# Patient Record
Sex: MALE | Race: WHITE | Employment: FULL TIME | URBAN - METROPOLITAN AREA
[De-identification: names, ages, dates, MRNs, and addresses within clinical notes are randomized per-mention and may not be internally consistent; named-entity substitution may affect disease eponyms.]

---

## 2017-02-20 ENCOUNTER — OFFICE VISIT (OUTPATIENT)
Dept: FAMILY MEDICINE | Facility: CLINIC | Age: 37
End: 2017-02-20

## 2017-02-20 VITALS
WEIGHT: 180.4 LBS | BODY MASS INDEX: 28.31 KG/M2 | DIASTOLIC BLOOD PRESSURE: 70 MMHG | HEART RATE: 96 BPM | HEIGHT: 67 IN | TEMPERATURE: 98.6 F | SYSTOLIC BLOOD PRESSURE: 128 MMHG | RESPIRATION RATE: 20 BRPM

## 2017-02-20 DIAGNOSIS — Z00.00 ENCOUNTER FOR ROUTINE ADULT HEALTH EXAMINATION WITHOUT ABNORMAL FINDINGS: Primary | ICD-10-CM

## 2017-02-20 DIAGNOSIS — L70.0 ACNE VULGARIS: ICD-10-CM

## 2017-02-20 PROCEDURE — 99395 PREV VISIT EST AGE 18-39: CPT | Performed by: PHYSICIAN ASSISTANT

## 2017-02-20 NOTE — MR AVS SNAPSHOT
After Visit Summary   2/20/2017    Ella Elizabeth    MRN: [id number]           Patient Information     Date Of Birth          1980        Visit Information        Provider Department      2/20/2017 1:00 PM Zoe Monet PA-C [facility]        Today's Diagnoses     Encounter for routine adult health examination without abnormal findings    -  1    Acne vulgaris           Follow-ups after your visit        Additional Services     DERMATOLOGY REFERRAL       Your provider has referred you to: [facility] [phone]   [email or web address]    Please be aware that coverage of these services is subject to the terms and limitations of your health insurance plan.  Call member services at your health plan with any benefit or coverage questions.      Please bring the following with you to your appointment:    (1) Any X-Rays, CTs or MRIs which have been performed.  Contact the facility where they were done to arrange for  prior to your scheduled appointment.    (2) List of current medications  (3) This referral request   (4) Any documents/labs given to you for this referral                  Follow-up notes from your care team     Return in about 1 year (around 2/20/2018) for Physical Exam.      Who to contact     If you have questions or need follow up information about today's clinic visit or your schedule please contact [facility] directly at [phone].  Normal or non-critical lab and imaging results will be communicated to you by MyChart, letter or phone within 4 business days after the clinic has received the results. If you do not hear from us within 7 days, please contact the clinic through MyChart or phone. If you have a critical or abnormal lab result, we will notify you by phone as soon as possible.  Submit refill requests through [employer] or call your pharmacy and they will forward the  "refill request to us. Please allow 3 business days for your refill to be completed.          Additional Information About Your Visit        MyChart Information     [employer] lets you send messages to your doctor, view your test results, renew your prescriptions, schedule appointments and more. To sign up, go to [email or web address] . Click on \"Log in\" on the left side of the screen, which will take you to the Welcome page. Then click on \"Sign up Now\" on the right side of the page.     You will be asked to enter the access code listed below, as well as some personal information. Please follow the directions to create your username and password.     Your access code is: [email or web address]  Expires: 2017 11:03 PM     Your access code will  in 90 days. If you need help or a new code, please call your [place] clinic or [phone].        Care EveryWhere ID     This is your [facility] EveryWhere ID. This could be used by other organizations to access your [place] medical records  [id number]        Your Vitals Were     Pulse Temperature Respirations Height BMI (Body Mass Index)       96 98.6  F (37  C) (Oral) 20 1.702 m (5' 7\") 28.25 kg/m2        Blood Pressure from Last 3 Encounters:   17 128/70   16 120/80    Weight from Last 3 Encounters:   17 81.8 kg (180 lb 6.4 oz)   16 83 kg (183 lb)              We Performed the Following     DERMATOLOGY REFERRAL        Primary Care Provider Office Phone # Fax #    Zoe Monet PA-C [phone]       [facility] [address] [facility] [address]  [facility] [address]        Thank you!     Thank you for choosing [facility]  for your care. Our goal is always to provide you with excellent care. Hearing back from our patients is one way we can continue to improve our services. Please take a few minutes to complete the written survey that you may receive in the mail after your visit with us. Thank " you!             Your Updated Medication List - Protect others around you: Learn how to safely use, store and throw away your medicines at www.disposemymeds.org.      Notice  As of 2/20/2017 11:03 PM    You have not been prescribed any medications.

## 2017-02-20 NOTE — NURSING NOTE
Ella Elizabeth is here for a CPX.    Pre-visit planning  Immunizations -up to date  Colonoscopy -  Mammogram -  Asthma test --  PHQ9 -  RC 7 -    Questioned patient about current smoking habits.  Pt. has never smoked.  Body mass index is 28.25 kg/(m^2).  PULSE regular  My Chart:   CLASSIFICATION OF OVERWEIGHT AND OBESITY BY BMI                        Obesity Class           BMI(kg/m2)  Underweight                                    < 18.5  Normal                                         18.5-24.9  Overweight                                     25.0-29.9  OBESITY                     I                  30.0-34.9                             II                 35.0-39.9  EXTREME OBESITY             III                >40                            Patient's  BMI Body mass index is 28.25 kg/(m^2).  Http://hin.nhlbi.nih.gov/menuplanner/menu.cgi  ETOH screening:  Questions:  1-How often do you have a drink containing alcohol?                             2-3 times per week(s)  2-How many drinks containing alcohol do you have on a typical day when you are         Drinking?                              1 - 2   3- How often do you have 5 or more drinks on one occasion?                              never per     Have you ever:  @None of the patient's responses to the CAGE screening were positive / Negative CAGE score@

## 2017-02-20 NOTE — PROGRESS NOTES
Ella Elizabeth is a 36 year old male presents for routine health maintenance.    Current concerns: Ella came in last year with facial redness and some small red bumps. Dr. Johns started him on minocycline which helped, but noticed it got worse when his prescription stopped, where the redness immediately worsening and the bumps came back even worse than before. Would like to see a dermatologist to go through options and would consider Accutane. Has been taking Vitamin A supplement as he has heard that Accutane is similar to Vitamin A.    Body mass index is 28.25 kg/(m^2).    Present exercise habits:  1-2 times/week  Present dietary habits:  eats regular meals and follows a balanced nutrition diet    Vit D intake: is not taking supplement    Is the patient a smoker? No  If yes, smoking cessation advised and counseling provided.     Cardiovascular risk factors: none    Over the past few weeks, have you felt down or depressed? Little interest or pleasure in doing things? No    Last dental appointment:  this year  Last optical appointment:  this year    Was the patient born between [birth year] and has not had Hep C testing?  No, not applicable    I have reviewed the following histories: Past Medical History, Past Surgical History, Social History, Family History, Problem List, Medication List and Allergies    Past Medical History   Diagnosis Date     NO ACTIVE PROBLEMS      Family History   Problem Relation Age of Onset     DIABETES No family hx of      Coronary Artery Disease No family hx of      Hypertension No family hx of      Hyperlipidemia No family hx of      CEREBROVASCULAR DISEASE No family hx of      Breast Cancer No family hx of      Colon Cancer No family hx of      Prostate Cancer No family hx of      Other Cancer No family hx of      Depression No family hx of      Anxiety Disorder No family hx of      MENTAL ILLNESS No family hx of      Substance Abuse No family hx of      Anesthesia Reaction No family hx  "of      Asthma No family hx of      OSTEOPOROSIS No family hx of      Thyroid Disease No family hx of      Genetic Disorder No family hx of      Obesity No family hx of      Social History     Social History     Marital status:      Spouse name: Luna Coles     Number of children: 2     Years of education: [employer] grad     Occupational History           Social History Main Topics     Smoking status: Never Smoker     Smokeless tobacco: Never Used     Alcohol use 1.2 - 1.8 oz/week     2 - 3 Standard drinks or equivalent per week     Drug use: No     Sexual activity: Yes     Partners: Female     Other Topics Concern      Service No     Blood Transfusions No     Caffeine Concern No     Occupational Exposure No     Hobby Hazards No     Sleep Concern No     Stress Concern No     Weight Concern No     Special Diet No     Back Care No     Seat Belt Yes     Social History Narrative     Patient Active Problem List   Diagnosis     Health Care Home     ACP (advance care planning)     No current outpatient prescriptions on file.     No current facility-administered medications for this visit.        Allergies:  No Known Allergies      ROS:  E/M: NEGATIVE for ear, nose, mouth and throat problems  R: NEGATIVE for significant/chronic cough or SOB  CV: NEGATIVE for chest pain or palpitations  GI: NEGATIVE for abdominal pain, chronic diarrhea or constipation  : NEGATIVE for dysuria, hematuria, weakened urinary stream      OBJECTIVE:    Vitals:    02/20/17 1257   BP: 128/70   BP Location: Right arm   Patient Position: Chair   Cuff Size: Adult Regular   Pulse: 96   Resp: 20   Temp: 98.6  F (37  C)   TempSrc: Oral   Weight: 81.8 kg (180 lb 6.4 oz)   Height: 1.702 m (5' 7\")       General: 36 year old male who appears her stated age. Vital signs noted.  Head: Normocephalic  Eyes: pupils equal round reactive to light and accomodation, extra ocular movements intact  Ears: external canals and tms free of " "abnormalities  Nose: patent, without mucosal abnormalities  Mouth and throat: without erythema or lesions of the mucosa  Neck: supple, without adenopathy or thyromegaly  Lungs: clear to auscultation, no wheezing or crackles  Cv: regular rate and rhythm, normal s1 and s2 without murmur or click  Abd: soft, non-tender, no masses, no hepatomegaly or splenomegaly.  Gu: normal external genitalia, no hernia  Ms: normal muscle tone & symmetry  Skin: Diffuse erythema on cheeks, forehead. Erythematous papules also scattered on peripheral surfaces of face. Occasional pustule.   Neuro: sensation and motor function grossly intact; cranial nerves without obvious abnormalities.        ASSESSMENT/PLAN:    1. Encounter for routine adult health examination without abnormal findings  No concerns today, not fasting, will recheck labs from last year in 1-2 years.    2. Acne vulgaris  Given that acne vulgaris did not resolve with 1 year of minocycline recommended being seen by dermatologist. They may need to consider other diagnoses like rosacea.  - DERMATOLOGY REFERRAL     reports that he has never smoked. He has never used smokeless tobacco.      Estimated body mass index is 28.25 kg/(m^2) as calculated from the following:    Height as of this encounter: 1.702 m (5' 7\").    Weight as of this encounter: 81.8 kg (180 lb 6.4 oz).  Weight management plan: Discussed healthy diet and exercise guidelines and patient will follow up in 12 months in clinic to re-evaluate.      Meds Suggested:      Vitamin D  Tests Recommended:      Regular Dental Examinations        Eye exam  Behavior Modifications:       Cardiovascular exercise 3 times per week--enough to get your Target Heart rate  Other recommendations:     BMI noted and discussed      Regular testicle exam     Encouraged My Chart    The patient will return to the clinic if symptoms are changing or concern with follow up as discussed. The patient understands and agrees with the " plan.      Zoe Monet PA-C  2/20/2017          Counseling Resources:  ATP IV Guidelines  Pooled Cohorts Equation Calculator  Breast Cancer Risk Calculator  FRAX Risk Assessment  ICSI Preventive Guidelines  Dietary Guidelines for Americans, 2010  [employer]'s MyPlate

## 2018-01-10 ENCOUNTER — OFFICE VISIT (OUTPATIENT)
Dept: FAMILY MEDICINE | Facility: CLINIC | Age: 38
End: 2018-01-10

## 2018-01-10 VITALS
BODY MASS INDEX: 26.27 KG/M2 | DIASTOLIC BLOOD PRESSURE: 68 MMHG | WEIGHT: 167.4 LBS | HEART RATE: 88 BPM | RESPIRATION RATE: 20 BRPM | TEMPERATURE: 98.6 F | SYSTOLIC BLOOD PRESSURE: 104 MMHG | HEIGHT: 67 IN

## 2018-01-10 DIAGNOSIS — J10.1 INFLUENZA B: Primary | ICD-10-CM

## 2018-01-10 LAB
FLUAV AG UPPER RESP QL IA.RAPID: ABNORMAL
FLUBV AG UPPER RESP QL IA.RAPID: ABNORMAL

## 2018-01-10 PROCEDURE — 99213 OFFICE O/P EST LOW 20 MIN: CPT | Performed by: PHYSICIAN ASSISTANT

## 2018-01-10 PROCEDURE — 87804 INFLUENZA ASSAY W/OPTIC: CPT | Performed by: PHYSICIAN ASSISTANT

## 2018-01-10 NOTE — PROGRESS NOTES
"CC: Flu-like symptoms    History:  Just over 48 hours ago, Ella started feeling significant sore throat, congestion, body aches, chills/sweats, headache, cough. Does not think he has had flu before. Did fly 1-2 days before onset. Feels like he is doing okay at drinking fluids, but could push more fluids. No known flu exposure.     Has been taking ibuprofen, Cepachol, Tylenol.     PMH, MEDICATIONS, ALLERGIES, SOCIAL AND FAMILY HISTORY in [facility] and reviewed by me personally.      ROS negative other than the symptoms noted above in the HPI.        Examination   /68 (BP Location: Left arm, Patient Position: Chair, Cuff Size: Adult Regular)  Pulse 88  Temp 98.6  F (37  C) (Oral)  Resp 20  Ht 1.702 m (5' 7\")  Wt 75.9 kg (167 lb 6.4 oz)  BMI 26.22 kg/m2       Constitutional: Sitting comfortably, in no acute distress. Vital signs noted  Eyes: pupils equal round reactive to light and accomodation, extra ocular movements intact  Ears: external canals and TMs free of abnormalities  Nose: patent, without mucosal abnormalities  Mouth and throat: without erythema or lesions of the mucosa  Neck:  no adenopathy, trachea midline and normal to palpation  Cardiovascular:  regular rate and rhythm, no murmurs, clicks, or gallops  Respiratory:  normal respiratory rate and rhythm, lungs clear to auscultation  SKIN: No jaundice/pallor/rash.   Psychiatric: mentation appears normal and affect normal/bright        A/P    ICD-10-CM    1. Influenza B J10.1 Influenza A and B (BFP)       DISCUSSION: Influenza swab positive for influenza B.   Explained that infleunza tends to resolve within 5-7 days.   Offered Tamiflu to take if interested. No high-quality evidence to say it's effective, and if effective thought to only shorten symptoms by approximately 1 day.   Patient declined   Patient declined work excuse note to return to work when symptoms resolve.   Further recommended symptomatic therapies:  Alternate ibuprofen (with food) and " Tylenol every 3 hours to help with pain and inflammation.  Can substitute cough suppressant like Dayquil/Delsym/Robitussin for Tylenol dose, as these usually contain Tylenol/acetaminophen.  Should continue to push fluids, and should try to find simple foods to help gain calories. Go to ER if unable to get fluid intake, extreme fatigue, or cannot keep fever <102 degrees.     Contact me with any questions or concerns.    follow up visit: As needed    Zoe Monet PA-C  [facility]

## 2018-01-10 NOTE — MR AVS SNAPSHOT
"              After Visit Summary   1/10/2018    Ella Elizabeth    MRN: [id number]           Patient Information     Date Of Birth          1980        Visit Information        Provider Department      1/10/2018 11:30 AM Zoe Monet PA-C [facility]        Today's Diagnoses     Fever    -  1      Care Instructions    Alternate Tylenol and ibuprofen every 3 hours.  Can substitute Dayquil for Tylenol.  Push fluids, and try to get some calorie intake.  ER for IV fluids if worsening.  Should improve significantly after 3 days, and return to normal in approximately 7 days.             Follow-ups after your visit        Who to contact     If you have questions or need follow up information about today's clinic visit or your schedule please contact [facility] directly at [phone].  Normal or non-critical lab and imaging results will be communicated to you by [employer], letter or phone within 4 business days after the clinic has received the results. If you do not hear from us within 7 days, please contact the clinic through [employer] or phone. If you have a critical or abnormal lab result, we will notify you by phone as soon as possible.  Submit refill requests through [employer] or call your pharmacy and they will forward the refill request to us. Please allow 3 business days for your refill to be completed.          Additional Information About Your Visit        [employer] Information     [employer] lets you send messages to your doctor, view your test results, renew your prescriptions, schedule appointments and more. To sign up, go to [email or web address] . Click on \"Log in\" on the left side of the screen, which will take you to the Welcome page. Then click on \"Sign up Now\" on the right side of the page.     You will be asked to enter the access code listed below, as well as some personal information. Please follow the directions to create your username and " "password.     Your access code is: [email or web address]  Expires: 4/10/2018 12:15 PM     Your access code will  in 90 days. If you need help or a new code, please call your [place] clinic or [phone].        Care EveryWhere ID     This is your Care EveryWhere ID. This could be used by other organizations to access your [place] medical records  [id number]        Your Vitals Were     Pulse Temperature Respirations Height BMI (Body Mass Index)       88 98.6  F (37  C) (Oral) 20 1.702 m (5' 7\") 26.22 kg/m2        Blood Pressure from Last 3 Encounters:   01/10/18 104/68   17 128/70   16 120/80    Weight from Last 3 Encounters:   01/10/18 75.9 kg (167 lb 6.4 oz)   17 81.8 kg (180 lb 6.4 oz)   16 83 kg (183 lb)              We Performed the Following     Influenza A and B (BFP)        Primary Care Provider Office Phone # Fax #    Zoe Monet PA-C [phone]       [address] [place] [id number]        Equal Access to Services     MARILU NOVAK : Hadii aad ku hadasho Soomaali, waaxda luqadaha, qaybta kaalmada adeegyada, waxay idiin haymarisoln monico aiken lashabbir . So [facility] [phone].    ATENCIÓN: Si habla español, tiene a boles disposición servicios gratuitos de asistencia lingüística. Lllake al [phone].    We comply with applicable federal civil rights laws and [place] laws. We do not discriminate on the basis of race, color, national origin, age, disability, sex, sexual orientation, or gender identity.            Thank you!     Thank you for choosing [facility]  for your care. Our goal is always to provide you with excellent care. Hearing back from our patients is one way we can continue to improve our services. Please take a few minutes to complete the written survey that you may receive in the mail after your visit with us. Thank you!             Your Updated Medication List - Protect others around you: Learn how to safely " use, store and throw away your medicines at www.disposemymeds.org.      Notice  As of 1/10/2018 12:15 PM    You have not been prescribed any medications.

## 2018-01-10 NOTE — NURSING NOTE
Ella Elizabeth presents with an illness characterized by dry cough, fever, chills, sweats, nasal congestion, rhinorrhea and sore throat. Symptoms began 3 days ago and are unchanged since that time.  Questioned patient about current smoking habits.  Pt. has never smoked.  Body mass index is 33.67 kg/(m^2).  PULSE regular  My Chart:   Body mass index is 26.22 kg/(m^2).  Pre-visit planning  Immunizations - up to date  Colonoscopy -   Mammogram -   Asthma -   PHQ9 -    RC-7 -

## 2018-01-10 NOTE — PATIENT INSTRUCTIONS
Alternate Tylenol and ibuprofen every 3 hours.  Can substitute Dayquil for Tylenol.  Push fluids, and try to get some calorie intake.  ER for IV fluids if worsening.  Should improve significantly after 3 days, and return to normal in approximately 7 days.

## 2018-01-30 ENCOUNTER — OFFICE VISIT (OUTPATIENT)
Dept: FAMILY MEDICINE | Facility: CLINIC | Age: 38
End: 2018-01-30

## 2018-01-30 VITALS
TEMPERATURE: 100.2 F | DIASTOLIC BLOOD PRESSURE: 70 MMHG | BODY MASS INDEX: 26.65 KG/M2 | HEIGHT: 67 IN | RESPIRATION RATE: 20 BRPM | HEART RATE: 84 BPM | WEIGHT: 169.8 LBS | OXYGEN SATURATION: 96 % | SYSTOLIC BLOOD PRESSURE: 124 MMHG

## 2018-01-30 DIAGNOSIS — R05.9 COUGH: ICD-10-CM

## 2018-01-30 DIAGNOSIS — J10.1 INFLUENZA B: Primary | ICD-10-CM

## 2018-01-30 LAB
% GRANULOCYTES: 56.7 %
HCT VFR BLD AUTO: 45.4 % (ref 40–53)
HEMOGLOBIN: 14.6 G/DL (ref 13.3–17.7)
LYMPHOCYTES NFR BLD AUTO: 28 %
MCH RBC QN AUTO: 30.2 PG (ref 26–33)
MCHC RBC AUTO-ENTMCNC: 32.2 G/DL (ref 31–36)
MCV RBC AUTO: 93.6 FL (ref 78–100)
MONOCYTES NFR BLD AUTO: 15.3 %
PLATELET COUNT - QUEST: 191 10^9/L (ref 150–375)
RBC # BLD AUTO: 4.83 10*12/L (ref 4.4–5.9)
WBC # BLD AUTO: 4 10*9/L (ref 4–11)

## 2018-01-30 PROCEDURE — 36415 COLL VENOUS BLD VENIPUNCTURE: CPT | Performed by: PHYSICIAN ASSISTANT

## 2018-01-30 PROCEDURE — 71046 X-RAY EXAM CHEST 2 VIEWS: CPT | Performed by: PHYSICIAN ASSISTANT

## 2018-01-30 PROCEDURE — 99213 OFFICE O/P EST LOW 20 MIN: CPT | Performed by: PHYSICIAN ASSISTANT

## 2018-01-30 PROCEDURE — 85025 COMPLETE CBC W/AUTO DIFF WBC: CPT | Performed by: PHYSICIAN ASSISTANT

## 2018-01-30 RX ORDER — AZITHROMYCIN 250 MG/1
TABLET, FILM COATED ORAL
Qty: 6 TABLET | Refills: 0 | Status: SHIPPED | OUTPATIENT
Start: 2018-01-30 | End: 2018-09-12

## 2018-01-30 NOTE — MR AVS SNAPSHOT
"              After Visit Summary   1/30/2018    Ella Elizabeth    MRN: [id number]           Patient Information     Date Of Birth          1980        Visit Information        Provider Department      1/30/2018 11:00 AM Delfina Escudero PA [facility]        Today's Diagnoses     Influenza B    -  1    Cough           Follow-ups after your visit        Who to contact     If you have questions or need follow up information about today's clinic visit or your schedule please contact [facility] directly at [phone].  Normal or non-critical lab and imaging results will be communicated to you by MyChart, letter or phone within 4 business days after the clinic has received the results. If you do not hear from us within 7 days, please contact the clinic through MyChart or phone. If you have a critical or abnormal lab result, we will notify you by phone as soon as possible.  Submit refill requests through [employer] or call your pharmacy and they will forward the refill request to us. Please allow 3 business days for your refill to be completed.          Additional Information About Your Visit        Care EveryWhere ID     This is your Care EveryWhere ID. This could be used by other organizations to access your [place] medical records  [id number]        Your Vitals Were     Pulse Temperature Respirations Height Pulse Oximetry BMI (Body Mass Index)    84 100.2  F (37.9  C) (Oral) 20 1.702 m (5' 7\") 96% 26.59 kg/m2       Blood Pressure from Last 3 Encounters:   01/30/18 124/70   01/10/18 104/68   02/20/17 128/70    Weight from Last 3 Encounters:   01/30/18 77 kg (169 lb 12.8 oz)   01/10/18 75.9 kg (167 lb 6.4 oz)   02/20/17 81.8 kg (180 lb 6.4 oz)              We Performed the Following     CL AFF HEMOGRAM/PLATE/DIFF (BFP)     HC XRAY CHEST, 2 VIEWS     VENOUS COLLECTION          Today's Medication Changes          These changes are accurate as of 1/30/18 " 11:59 PM.  If you have any questions, ask your nurse or doctor.               Start taking these medicines.        Dose/Directions    azithromycin 250 MG tablet   Commonly known as:  ZITHROMAX   Used for:  Cough   Started by:  Delfina Escudero PA        Two tablets first day, then one tablet daily for four days.   Quantity:  6 tablet   Refills:  0            Where to get your medicines      These medications were sent to [place] [address] [place] [facility] - [place], [place] - [address] [place] [address] [address] [facility] [address], [facility] [address]     Phone:  [phone]     azithromycin 250 MG tablet                Primary Care Provider Office Phone # Fax #    Zoe Denisse Mnoet PA-C [phone] [address] [address]        Equal Access to Services     [facility] SCARLET : Hadii olman hickman hadasho Soomaali, waaxda luqadaha, qaybta kaalmada adeegyada, nino cintron . So [facility] [phone].    ATENCIÓN: Si habla español, tiene a boles disposición servicios gratuitos de asistencia lingüística. Bonny al [phone].    We comply with applicable federal civil rights laws and [place] laws. We do not discriminate on the basis of race, color, national origin, age, disability, sex, sexual orientation, or gender identity.            Thank you!     Thank you for choosing [facility]  for your care. Our goal is always to provide you with excellent care. Hearing back from our patients is one way we can continue to improve our services. Please take a few minutes to complete the written survey that you may receive in the mail after your visit with us. Thank you!             Your Updated Medication List - Protect others around you: Learn how to safely use, store and throw away your medicines at www.disposemymeds.org.          This list is accurate as of 1/30/18 11:59 PM.  Always use your most recent med list.                   Brand Name Dispense  Instructions for use Diagnosis    azithromycin 250 MG tablet    ZITHROMAX    6 tablet    Two tablets first day, then one tablet daily for four days.    Cough

## 2018-01-30 NOTE — NURSING NOTE
Ella Elizabeth is here for a cough and not feeling well. He has already had the flu, but feels like it is coming back.    Questioned patient about current smoking habits.  Pt. has never smoked.  PULSE regular  My Chart: declines  CLASSIFICATION OF OVERWEIGHT AND OBESITY BY BMI                        Obesity Class           BMI(kg/m2)  Underweight                                    < 18.5  Normal                                         18.5-24.9  Overweight                                     25.0-29.9  OBESITY                     I                  30.0-34.9                             II                 35.0-39.9  EXTREME OBESITY             III                >40                            Patient's  BMI Body mass index is 26.59 kg/(m^2).  http://hin.nhlbi.nih.gov/menuplanner/menu.cgi  Pre-visit planning  Immunizations - up to date  Colonoscopy -   Mammogram -   Asthma -   PHQ9 -    RC-7 -
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
## 2018-09-12 ENCOUNTER — OFFICE VISIT (OUTPATIENT)
Dept: FAMILY MEDICINE | Facility: CLINIC | Age: 38
End: 2018-09-12

## 2018-09-12 VITALS
DIASTOLIC BLOOD PRESSURE: 68 MMHG | HEART RATE: 91 BPM | TEMPERATURE: 99 F | BODY MASS INDEX: 25.94 KG/M2 | SYSTOLIC BLOOD PRESSURE: 118 MMHG | WEIGHT: 165.6 LBS | OXYGEN SATURATION: 97 %

## 2018-09-12 DIAGNOSIS — S69.92XA HAND INJURY, LEFT, INITIAL ENCOUNTER: Primary | ICD-10-CM

## 2018-09-12 DIAGNOSIS — Z23 NEED FOR VACCINATION: ICD-10-CM

## 2018-09-12 DIAGNOSIS — Z30.2 ENCOUNTER FOR VASECTOMY: ICD-10-CM

## 2018-09-12 PROCEDURE — 90686 IIV4 VACC NO PRSV 0.5 ML IM: CPT | Performed by: PHYSICIAN ASSISTANT

## 2018-09-12 PROCEDURE — 99213 OFFICE O/P EST LOW 20 MIN: CPT | Mod: 25 | Performed by: PHYSICIAN ASSISTANT

## 2018-09-12 PROCEDURE — 90471 IMMUNIZATION ADMIN: CPT | Performed by: PHYSICIAN ASSISTANT

## 2018-09-12 PROCEDURE — 73130 X-RAY EXAM OF HAND: CPT | Mod: LT | Performed by: PHYSICIAN ASSISTANT

## 2018-09-12 NOTE — PROGRESS NOTES
CC: Hockey injury left hand    History:  Ella is here with a left hand injury that happened 3 weeks ago playing hockey. Was taking slap shots, and suddenly felt pain in medial surface of left hand new middle of 5th MCP. It had been a tolerable pain, and had been able to continue playing hockey, but today worsened again with playing hockey. Anything flexing the 5th finger forward seems to hurt. No numbness/tingling out into fingertips. Has taken a few ibuprofen, but hasn't really rested or iced.      Ella would also like a referral for a vasectomy.     PMH, MEDICATIONS, ALLERGIES, SOCIAL AND FAMILY HISTORY in [facility] and reviewed by me personally.      ROS negative other than the symptoms noted above in the HPI.        Examination   /68 (BP Location: Right arm, Patient Position: Sitting, Cuff Size: Adult Regular)  Pulse 91  Temp 99  F (37.2  C) (Oral)  Wt 75.1 kg (165 lb 9.6 oz)  SpO2 97%  BMI 25.94 kg/m2       Constitutional: Sitting comfortably, in no acute distress. Vital signs noted  Neck:  no adenopathy, trachea midline and normal to palpation  Cardiovascular:  regular rate and rhythm, no murmurs, clicks, or gallops  Respiratory:  normal respiratory rate and rhythm, lungs clear to auscultation  M/S: ROM of left hand 5th digit 80% of normal flexion, full extension. Mild pain over mid 5th MCP recreated with flexion, but no tenderness to palpation.  NEURO:  muscle strength normal, reflexes normal and symmetric  SKIN: No jaundice/pallor/rash.   Psychiatric: mentation appears normal and affect normal/bright        A/P    ICD-10-CM    1. Hand injury, left, initial encounter S69.92XA XR Hand Left G/E 3 Views   2. Encounter for vasectomy Z30.2 UROLOGY ADULT REFERRAL   3. Need for vaccination Z23 HC FLU VAC PRESRV FREE QUAD SPLIT VIR 3+YRS IM     VACCINE ADMINISTRATION, INITIAL       DISCUSSION:  X-ray of left hand appears negative- confirmed by radiology report. Most likely a ligament injury that has not  been given the proper treatment to recover. Recommended ice 2-3 times daily for 15-20 minutes. Take Aleve 1 tablet twice daily with food. Avoid aggravating activities like hockey for 2 weeks. Contact me at that time if not resolving, and would refer to hand specialists/ortho to consider MRI, PT, other treatment options.     Referred to urology for vasectomy.    follow up visit: As needed    Zoe Monet PA-C  [facility]

## 2018-09-12 NOTE — NURSING NOTE
Ella is here for L hand injured playing hockey 3 weeks ago, got worse during today's game        Pre-visit Screening:  Immunizations:  up to date  Colonoscopy:  NA  Mammogram: NA  Asthma Action Test/Plan:  NA  PHQ9:  none  GAD7:  none  Questioned patient about current smoking habits Pt. has never smoked.  Ok to leave detailed message on voice mail for today's visit only Yes, phone # [phone]

## 2018-09-12 NOTE — MR AVS SNAPSHOT
After Visit Summary   9/12/2018    Ella Elizabeth    MRN: [id number]           Patient Information     Date Of Birth          1980        Visit Information        Provider Department      9/12/2018 2:30 PM Zoe Monet PA-C [facility]        Today's Diagnoses     Hand injury, left, initial encounter    -  1    Encounter for vasectomy        Need for vaccination           Follow-ups after your visit        Additional Services     UROLOGY ADULT REFERRAL       Your provider has referred you to: OTHER PROVIDERS:  [facility] [place]. Please fax referral. [phone] (M) [facility]    Please be aware that coverage of these services is subject to the terms and limitations of your health insurance plan.  Call member services at your health plan with any benefit or coverage questions.      Please bring the following with you to your appointment:    (1) Any X-Rays, CTs or MRIs which have been performed.  Contact the facility where they were done to arrange for  prior to your scheduled appointment.    (2) List of current medications  (3) This referral request   (4) Any documents/labs given to you for this referral                  Follow-up notes from your care team     Return if symptoms worsen or fail to improve.      Who to contact     If you have questions or need follow up information about today's clinic visit or your schedule please contact [facility] directly at [phone].  Normal or non-critical lab and imaging results will be communicated to you by MyChart, letter or phone within 4 business days after the clinic has received the results. If you do not hear from us within 7 days, please contact the clinic through MyChart or phone. If you have a critical or abnormal lab result, we will notify you by phone as soon as possible.  Submit refill requests through [employer] or call your pharmacy and they will forward the refill request  "to us. Please allow 3 business days for your refill to be completed.          Additional Information About Your Visit        [employer]hart Information     [employer] lets you send messages to your doctor, view your test results, renew your prescriptions, schedule appointments and more. To sign up, go to [email or web address] . Click on \"Log in\" on the left side of the screen, which will take you to the Welcome page. Then click on \"Sign up Now\" on the right side of the page.     You will be asked to enter the access code listed below, as well as some personal information. Please follow the directions to create your username and password.     Your access code is: [email or web address]  Expires: 2018 11:54 PM     Your access code will  in 90 days. If you need help or a new code, please call your [place] clinic or [phone].        Care EveryWhere ID     This is your Care EveryWhere ID. This could be used by other organizations to access your [place] medical records  [id number]        Your Vitals Were     Pulse Temperature Pulse Oximetry BMI (Body Mass Index)          91 99  F (37.2  C) (Oral) 97% 25.94 kg/m2         Blood Pressure from Last 3 Encounters:   18 118/68   18 124/70   01/10/18 104/68    Weight from Last 3 Encounters:   18 75.1 kg (165 lb 9.6 oz)   18 77 kg (169 lb 12.8 oz)   01/10/18 75.9 kg (167 lb 6.4 oz)              We Performed the Following     HC FLU VAC PRESRV FREE QUAD SPLIT VIR 3+YRS IM     UROLOGY ADULT REFERRAL     VACCINE ADMINISTRATION, INITIAL     XR Hand Left G/E 3 Views        Primary Care Provider Office Phone # Fax #    Zoe Monet PA-C [phone] [address] [address]        Equal Access to Services     NUNU NOVAK : [place], waaxjohny luqrenay, qatoña whitfieldalnino jay. [facility] [phone].    ATENCIÓN: Si alyssa robertson " disposición servicios gratuitos de asistencia lingüística. Bonny willard [phone].    We comply with applicable federal civil rights laws and [place] laws. We do not discriminate on the basis of race, color, national origin, age, disability, sex, sexual orientation, or gender identity.            Thank you!     Thank you for choosing [facility]  for your care. Our goal is always to provide you with excellent care. Hearing back from our patients is one way we can continue to improve our services. Please take a few minutes to complete the written survey that you may receive in the mail after your visit with us. Thank you!             Your Updated Medication List - Protect others around you: Learn how to safely use, store and throw away your medicines at www.disposemymeds.org.      Notice  As of 9/12/2018 11:54 PM    You have not been prescribed any medications.

## 2019-03-06 ENCOUNTER — HOSPITAL ENCOUNTER (EMERGENCY)
Facility: CLINIC | Age: 39
Discharge: HOME | End: 2019-03-06
Admitting: PHYSICIAN ASSISTANT
Payer: COMMERCIAL

## 2019-03-06 VITALS
HEART RATE: 97 BPM | RESPIRATION RATE: 18 BRPM | OXYGEN SATURATION: 97 % | SYSTOLIC BLOOD PRESSURE: 126 MMHG | DIASTOLIC BLOOD PRESSURE: 83 MMHG | TEMPERATURE: 98.9 F

## 2019-03-06 DIAGNOSIS — S02.2XXA NASAL FRACTURE: ICD-10-CM

## 2019-03-06 DIAGNOSIS — S01.81XA FACIAL LACERATION, INITIAL ENCOUNTER: ICD-10-CM

## 2019-03-06 PROCEDURE — 12011 RPR F/E/E/N/L/M 2.5 CM/<: CPT

## 2019-03-06 PROCEDURE — 99283 EMERGENCY DEPT VISIT LOW MDM: CPT

## 2019-03-06 RX ORDER — LIDOCAINE HYDROCHLORIDE AND EPINEPHRINE 10; 10 MG/ML; UG/ML
INJECTION, SOLUTION INFILTRATION; PERINEURAL
Status: DISCONTINUED
Start: 2019-03-06 | End: 2019-03-06 | Stop reason: HOSPADM

## 2019-03-06 ASSESSMENT — ENCOUNTER SYMPTOMS
HEADACHES: 1
NECK PAIN: 1
WOUND: 1
VOMITING: 0

## 2019-03-06 NOTE — ED PROVIDER NOTES
History     Chief Complaint:  Facial Injury    HPI   Ella Elizabeth is a 38 year old male who presents with facial injury. The patient states about 1.5 hours ago he was playing hokey and got shouldered to the face. The patient denies LOC and denies any vomiting. he notes he has been bleeding and he feels like his nose may be broken.  He notes he has a small laceration to the left upper lip. The patient note minor neck pain and he notes the start of a headache here in the ED. He is not nauseated or dizzy and denies blurred vision. He denies any jaw pain or pain to the zygomatic bone.  He feels as though his teeth come together normallyHe is not on blood thinners. Per review of [facility], the patient had his last Tdap in 2016.     Allergies:  No Known Drug Allergies    Medications:    Medications reviewed. No current medications.     Past Medical History:    Medical history reviewed. No pertinent medical history.    Past Surgical History:    Surgical history reviewed. No pertinent surgical history.    Family History:    Family history reviewed. No pertinent family history.     Past Surgical History:    Open reduction internal fixation forearm, left    Family History:    No past pertinent family history.    Social History:  Smoking Status: Never Smoker  Smokeless Tobacco: Never Used  Alcohol Use: Positive  Marital Status:        Review of Systems   HENT: Positive for nosebleeds.         Bent nose and difficulty breathing out of left nostril  No jaw pain or pain to the zygomatic bone   Gastrointestinal: Negative for vomiting.   Musculoskeletal: Positive for neck pain (mild).   Skin: Positive for wound (laceration to infranasal region of face).   Neurological: Positive for headaches (mild).   All other systems reviewed and are negative.    Physical Exam     Patient Vitals for the past 24 hrs:   BP Temp Temp src Pulse Resp SpO2   03/06/19 1409 126/83 98.9  F (37.2  C) Temporal 97 18 97 %     Physical  Exam  General: Alert and cooperative with exam. Resting comfortably on gurney  Head:  Scalp is NC/AT  Eyes:  No scleral icterus, PERRL, EOMI without pain.  ENT:   Swollen over the nasal bridge with mild deformity of external nose consistent with nasal fracture.  No evidence of septal hematoma.    Mid face stable to palpation. No tenderness over orbits or maxillary bones. Oropharynx normal without evidence of dental trauma.   Neck:  Normal range of motion without rigidity.  MS:  No midline cervical, thoracic, or lumbar tenderness. Normal range of motion in neck   Skin:  Warm and dry, No rash or lesions noted.  Neuro: Oriented x 3. No gross motor deficits.    Cranial nerves 2-12 intact. GCS: 15.     Strength and sensation normal in upper and lower extremities.  Psych:  Awake. Alert. Normal affect. Appropriate interactions.  Emergency Department Course   Procedures:  [facility] Procedure Note        Laceration Repair:    Performed by: Sharif SAXENA  Consent given by: Patient who states understanding of the procedure being performed after discussing the risks, benefits and alternatives.    Preparation: Patient was prepped and draped in usual sterile fashion.  Irrigation solution: saline    Body area:infranasal region of face   Laceration length: 1cm horizontal   Contamination: The wound is not contaminated.  Foreign bodies:none  Tendon involvement: none  Anesthesia: Local  Local anesthetic: Lidocaine     1%, with epinephrine  Anesthetic total: 2ml    Debridement: none  Skin closure: Closed with 2 x 5.0 fast absorbing gut  Technique: interrupted  Approximation: close  Approximation difficulty: simple    Patient tolerance: Patient tolerated the procedure well with no immediate complications.    [facility] Procedure Note        Laceration Repair:    Performed by: Sharif SAXENA  Consent given by: Patient who states understanding of the procedure being performed after discussing the risks, benefits and  alternatives.    Preparation: Patient was prepped and draped in usual sterile fashion.  Irrigation solution: saline    Body area:nose  Laceration length: 0.5cm  Contamination: The wound is not contaminated.  Foreign bodies:none  Tendon involvement: none    Debridement: none  Skin closure: Closed with Wound adhesive, Dermabond     Patient tolerance: Patient tolerated the procedure well with no immediate complications.    Emergency Department Course:    1434 Nursing notes and vitals reviewed.    1436 I performed an exam of the patient as documented above.     1530 I returned to check on the patient. I preformed the laceration procedure as noted above.  personally answered all related questions prior to discharge.    Impression & Plan      Medical Decision Making:  Ella Elizabeth is a 38 year old male who presents to the emergency department today for evaluation of nasal pain and facial laceration after getting elbowed in the face playing hockey.  Patient history and records reviewed.  On examination, the patient has a slight deformity to the nose, with some dried blood in the nares bilaterally.  No signs of septal hematoma.  The remainder of the facial exam is unremarkable without tenderness, step-offs, instability, and extraocular movements are intact without pain and do not suspect fracture to the orbit, maxillary or other facial bones.  No signs of dental trauma.  Do not feel CT scan of the face  indicated at this time. By [place] CT criteria, the patient falls into a very low risk category for skull fracture or intracranial injury. I have discussed the risk/benefit analysis of CT imaging in light of the above with patient, and we have decided together against CT imaging.     Lacerations were repaired as above after irrigation and anesthesia with absorbable suture and Dermabond.  Tetanus is up-to-date.  He will return to the emergency department if new or worsening symptoms such as worsening headache, vomiting,  seizure, numbness or weakness.  Discussed to watch for signs of infection such as redness, swelling, drainage, fever.  Regarding his nasal fracture, I advised ibuprofen, ice, elevation.  I did offer a short course of pain medication but the patient declined and states he prefers to just use NSAIDs.  We discussed reduction in the emergency department versus reduction by ENT after swelling has gone down, and the patient is comfortable following up with ENT for evaluation and reduction in several days.  He was given contact information as below.    Diagnosis:    ICD-10-CM    1. Facial laceration, initial encounter S01.81XA    2. Nasal fracture S02.2XXA         Disposition:   The patient is discharged to home.    Discharge Medications:  No discharge medication.     Scribe Disclosure:  I, Silvia Fine, am serving as a scribe at 2:34 PM on 3/6/2019 to document services personally performed by Cierra SAXENA based on my observations and the provider's statements to me.  [facility] EMERGENCY DEPARTMENT       Sharif Norton PA-C  03/06/19 [id number]

## 2019-03-06 NOTE — ED AVS SNAPSHOT
[facility] Emergency Department  [address]  [facility] [address]  Phone:  [phone]  Fax:  [phone]                                    Ella Elizabeth   MRN: [id number]    Department:  [facility] Emergency Department   Date of Visit:  3/6/2019           After Visit Summary Signature Page    I have received my discharge instructions, and my questions have been answered. I have discussed any challenges I see with this plan with the nurse or doctor.    ..........................................................................................................................................  Patient/Patient Representative Signature      ..........................................................................................................................................  Patient Representative Print Name and Relationship to Patient    ..................................................               ................................................  Date                                   Time    ..........................................................................................................................................  Reviewed by Signature/Title    ...................................................              ..............................................  Date                                               Time          22EPIC Rev 08/18

## 2019-03-06 NOTE — ED TRIAGE NOTES
Patient playing hockey about 1 1/2 hours ago and got shoulder to face injuring nose. Dry blood on nose. Patient was wearing helmet with 1/2 mask. ABC's intact.

## 2019-03-11 ENCOUNTER — TRANSFERRED RECORDS (OUTPATIENT)
Dept: FAMILY MEDICINE | Facility: CLINIC | Age: 39
End: 2019-03-11

## 2019-05-07 ENCOUNTER — OFFICE VISIT (OUTPATIENT)
Dept: FAMILY MEDICINE | Facility: CLINIC | Age: 39
End: 2019-05-07

## 2019-05-07 VITALS
HEART RATE: 70 BPM | DIASTOLIC BLOOD PRESSURE: 68 MMHG | HEIGHT: 67 IN | SYSTOLIC BLOOD PRESSURE: 110 MMHG | TEMPERATURE: 97.1 F | WEIGHT: 168.6 LBS | BODY MASS INDEX: 26.46 KG/M2 | OXYGEN SATURATION: 99 %

## 2019-05-07 DIAGNOSIS — L71.9 ROSACEA: Primary | ICD-10-CM

## 2019-05-07 PROCEDURE — 99213 OFFICE O/P EST LOW 20 MIN: CPT | Performed by: PHYSICIAN ASSISTANT

## 2019-05-07 RX ORDER — DOXYCYCLINE HYCLATE 100 MG
100 TABLET ORAL DAILY
Qty: 30 TABLET | Refills: 1 | Status: SHIPPED | OUTPATIENT
Start: 2019-05-07 | End: 2024-07-25

## 2019-05-07 ASSESSMENT — MIFFLIN-ST. JEOR: SCORE: 1629.42

## 2019-05-07 NOTE — NURSING NOTE
Ella is here for a rash on his face for a week.          Pre-visit Screening:  Immunizations:  up to date  Colonoscopy:  NA  Mammogram: NA  Asthma Action Test/Plan:  NA  PHQ9:  None  GAD7:  None  Questioned patient about current smoking habits Pt. has never smoked.  Ok to leave detailed message on voice mail for today's visit only Yes, phone # [phone]

## 2019-05-07 NOTE — PROGRESS NOTES
"CC: Rash    History:  Ella is here today with a rash on his face for the past 1 week. 1.5 months ago, he started getting small red dots on forehead. Since then, has been getting worse and worse. Now somewhat on cheeks, and sides of neck as well. The ones on sides of neck and forehead are mildly itchy. The rash seems to be the worst at night, fade overnight, and then get more pronounced again over the course of the day. Cannot think of any obvious triggers, no laundry product changes, shampoo changes, diet changes. The lesions do tend to start as little pustules. Has not taken any daily antihistamine. Has been using his standard Cereve moisturizer. If anything, this is getting worse.     Ella does have some rosacea in general. Does have a history of seasonal allergies, but is not having much sneezing, just congestion. Has taken both oral minocycline and doxycycline before and this has helped clear lesions.     PMH, MEDICATIONS, ALLERGIES, SOCIAL AND FAMILY HISTORY in [facility] and reviewed by me personally.      ROS negative other than the symptoms noted above in the HPI.        Examination   /68 (BP Location: Left arm, Patient Position: Sitting, Cuff Size: Adult Large)   Pulse 70   Temp 97.1  F (36.2  C) (Oral)   Ht 1.695 m (5' 6.75\")   Wt 76.5 kg (168 lb 9.6 oz)   SpO2 99%   BMI 26.60 kg/m         Constitutional: Sitting comfortably, in no acute distress. Vital signs noted  Neck:  no adenopathy, trachea midline and normal to palpation  Cardiovascular:  regular rate and rhythm, no murmurs, clicks, or gallops  Respiratory:  normal respiratory rate and rhythm, lungs clear to auscultation  SKIN: No jaundice/pallor/rash. Small erythematous papules and pustules across forehead, as well as lateral neck.Mild scaling  Psychiatric: mentation appears normal and affect normal/bright        A/P    ICD-10-CM    1. Rosacea L71.9 DERMATOLOGY REFERRAL     doxycycline hyclate (VIBRA-TABS) 100 MG tablet "       DISCUSSION:  Rash is suspicious for eczema, but given pustular component would like to treat with antibiotic as well. Recommended over-the-counter hydrocortisone cream (Corisone 10) to affected areas twice daily. Warned of possible side effects of topical steroid therapy, and advised to monitor closely for possibly permanent skin changes like thinning, whitening of the skin, and stop immediately if noted. He should complete 2- 8 week course of doxycycline until clear. Take with food. Warned him of side effects including diarrhea, nausea, and sun sensitivity. He should contact me in 1-2 weeks if not significantly better.     Will refer to derm for further evaluation of rosacea:    [facility]   [address] [place] [address] DR JASMYNE Rolle [place] [address]  [phone] -- appt line  [phone] -- fax     follow up visit: As needed    [facility]

## 2019-05-07 NOTE — PATIENT INSTRUCTIONS
Rash is suspicious for eczema, but given pustular component would like to treat with antibiotic as well. Recommended over-the-counter hydrocortisone cream (Corisone 10) to affected areas twice daily. Warned of possible side effects of topical steroid therapy, and advised to monitor closely for possibly permanent skin changes like thinning, whitening of the skin, and stop immediately if noted.       [facility]   [id number] [place] [address] DR JASMYNE Rolle [place] [address]  [phone] -- appt line  [phone] -- fax

## 2019-05-21 ENCOUNTER — TRANSFERRED RECORDS (OUTPATIENT)
Dept: FAMILY MEDICINE | Facility: CLINIC | Age: 39
End: 2019-05-21

## 2020-01-16 ENCOUNTER — OFFICE VISIT (OUTPATIENT)
Dept: FAMILY MEDICINE | Facility: CLINIC | Age: 40
End: 2020-01-16

## 2020-01-16 VITALS
OXYGEN SATURATION: 97 % | BODY MASS INDEX: 27.15 KG/M2 | HEIGHT: 67 IN | SYSTOLIC BLOOD PRESSURE: 110 MMHG | TEMPERATURE: 98 F | WEIGHT: 173 LBS | DIASTOLIC BLOOD PRESSURE: 68 MMHG | HEART RATE: 67 BPM

## 2020-01-16 DIAGNOSIS — M25.562 ACUTE PAIN OF LEFT KNEE: ICD-10-CM

## 2020-01-16 DIAGNOSIS — S06.5XAA SUBDURAL HEMATOMA (H): Primary | ICD-10-CM

## 2020-01-16 PROCEDURE — 90471 IMMUNIZATION ADMIN: CPT | Performed by: PHYSICIAN ASSISTANT

## 2020-01-16 PROCEDURE — 90686 IIV4 VACC NO PRSV 0.5 ML IM: CPT | Performed by: PHYSICIAN ASSISTANT

## 2020-01-16 PROCEDURE — 99213 OFFICE O/P EST LOW 20 MIN: CPT | Mod: 25 | Performed by: PHYSICIAN ASSISTANT

## 2020-01-16 ASSESSMENT — MIFFLIN-ST. JEOR: SCORE: 1649.38

## 2020-01-16 NOTE — PROGRESS NOTES
"CC: Knee injury    History:  1 month ago, Ella was playing hockey and whole knee cap ran into the boards. Area got swollen and black and blue, which has gradually improved back to normal visually. Now every once in a while gets a little pain and redness on medial surface of left knee but only when he is pushing on it. Has not been icing or ibuprofen, and has been able to skate without symptoms.     PMH, MEDICATIONS, ALLERGIES, SOCIAL AND FAMILY HISTORY in [facility] and reviewed by me personally.    ROS negative other than the symptoms noted above in the HPI.    Examination   /68 (BP Location: Right arm, Patient Position: Sitting, Cuff Size: Adult Large)   Pulse 67   Temp 98  F (36.7  C) (Oral)   Ht 1.695 m (5' 6.75\")   Wt 78.5 kg (173 lb)   SpO2 97%   BMI 27.30 kg/m       Constitutional: Sitting comfortably, in no acute distress. Vital signs noted  Neck:  no adenopathy, trachea midline and normal to palpation  Cardiovascular:  regular rate and rhythm, no murmurs, clicks, or gallops  Respiratory:  normal respiratory rate and rhythm, lungs clear to auscultation  M/S: ROM of knees intact. Mildly tender to palpation over distal vastus medialis. Lachmans, posterior/anterior drawer test, Apley, ballottement test all negative.   NEURO:  muscle strength normal, reflexes normal and symmetric  SKIN: No jaundice/pallor/rash.   Psychiatric: mentation appears normal and affect normal/bright        A/P    ICD-10-CM    1. Subdural hematoma (H) S06.5X9A    2. Acute pain of left knee M25.562        DISCUSSION:  Suspect Ella's symptoms are due to a resolving hematoma from impact with the boards. My suspicion for a fracture or ligament injury are low, and reassured that he is seeing gradual improvement. Recommended icing/heating 2-3 times daily, and try to wear a compression sleeve to help with resolving hematoma. Contact me in 2 weeks if not back to normal, or sooner if worsening. Would consider x-ray or ortho referral. "     follow up visit: As needed    [facility]

## 2020-01-16 NOTE — NURSING NOTE
Ella is here for left knee injury playing hockey 2 months ago that is still painful at times and has concern about the healing.          Pre-visit Screening:  Immunizations:  up to date  Colonoscopy:  NA  Mammogram: NA  Asthma Action Test/Plan:  NA  PHQ9:  None  GAD7:  None  Questioned patient about current smoking habits Pt. has never smoked.  Ok to leave detailed message on voice mail for today's visit only Yes, phone # cell

## 2020-05-21 ENCOUNTER — VIRTUAL VISIT (OUTPATIENT)
Dept: UROLOGY | Facility: CLINIC | Age: 40
End: 2020-05-21
Payer: COMMERCIAL

## 2020-05-21 VITALS — BODY MASS INDEX: 26.68 KG/M2 | HEIGHT: 67 IN | WEIGHT: 170 LBS

## 2020-05-21 DIAGNOSIS — Z30.2 ENCOUNTER FOR STERILIZATION: Primary | ICD-10-CM

## 2020-05-21 PROCEDURE — 99203 OFFICE O/P NEW LOW 30 MIN: CPT | Mod: GT | Performed by: UROLOGY

## 2020-05-21 RX ORDER — IVERMECTIN 10 MG/G
CREAM TOPICAL DAILY
COMMUNITY
End: 2021-11-08

## 2020-05-21 ASSESSMENT — MIFFLIN-ST. JEOR: SCORE: 1634.74
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
## 2020-06-09 NOTE — PATIENT INSTRUCTIONS
"[facility] UROLOGY  Vasectomy Information  [phone]    Preoperative Instructions:    _x____ You may have breakfast on the morning of your procedure.  If your procedure is          in the afternoon, you may have lunch as well.    _x____ You must have someone drive you home after the procedure if you have been    prescribed an oral sedative (valium).                   _x____ Do not take any aspirin, blood-thinning or anti-inflammatory medication for at    least 7-10 days before the procedure (this includes but is not limited to baby aspirin, aspirin,  Ibuprofen (Advil,Aleve, Motrin),Celebrex, Exedrin,Ecotrin and Bufferin).  If you have ANY  Questions about this, please call.    _x____ Please shave your scrotum (see diagram) the morning of your procedure.  Use              Hibiclens (available at your local drugstore) antibacterial cleanser.        Postoperative Instructions Follow Vasectomy    Under routine circumstances, please note the following:    -No heavy lifting (over 15 lbs) for 48 hour.  -You may shower after 24 hours.  You may have a tub bath or use a swimming pool after one week postoperatively.  Your doctor will advise you if he feels it is helpful to soak in a bathtub postoperatively.  -Do not engage in intercourse for at least ten days and then proceed when comfortable.  -Wear an athletic supporter for 48 hours postoperatively or until any discomfort ceases.  -Your physician will instruct you regarding the use of ice in the recovery room or at home after the procedure, as necessary.  -Please remember as you resume your activity that you may experience some discomfor and/or swelling for the one to two weeks following the procedure.  If this occurs decrease activity and slightly elevate the scrotum (athletic supporter).  -As your stitches dissolve it may appear that the incision is \"gaping.\"  This is normal.    Necessary follow-up:  You do not need a follow up appointment unless you have problems after " your procedure.  Please call our office at [phone] if you do.    At the time of your procedure you will be given the supplies for your post procedure follow up.  The test should be done AT LEAST THREE MONTHS AFTER your vasecomy.  During this time, be certain to maintain birth control measure!    Between the time of your procedure and the time that you have your first sperm count it is very important that you have a minimum of 30 ejaculations.  If you have any questions about this, please consult your physician.    If the sperm count that is done at three months is negative, you will be considered sterile.  Until, you are told that you are free to discontinue birth control you are considered fertile.    If your sperm counts reveal the presence of sperm, you will be advised to repeat the test until a negative result is obtained.     NOTE:  Please call our office one week after dropping off your sample for the result.  Test results will only be given to the patient.

## 2020-07-30 ENCOUNTER — TELEPHONE (OUTPATIENT)
Dept: UROLOGY | Facility: CLINIC | Age: 40
End: 2020-07-30

## 2020-07-30 NOTE — TELEPHONE ENCOUNTER
Called to screen for CODVID-19 symptoms prior to tomorrow's visit.  Patient does not have any symptoms at this time.    Susan Laws, EMT

## 2020-07-31 ENCOUNTER — OFFICE VISIT (OUTPATIENT)
Dept: UROLOGY | Facility: CLINIC | Age: 40
End: 2020-07-31
Payer: COMMERCIAL

## 2020-07-31 VITALS
BODY MASS INDEX: 26.21 KG/M2 | HEIGHT: 67 IN | WEIGHT: 167 LBS | SYSTOLIC BLOOD PRESSURE: 114 MMHG | DIASTOLIC BLOOD PRESSURE: 76 MMHG

## 2020-07-31 DIAGNOSIS — Z30.2 ENCOUNTER FOR STERILIZATION: Primary | ICD-10-CM

## 2020-07-31 PROCEDURE — 55250 REMOVAL OF SPERM DUCT(S): CPT | Performed by: UROLOGY

## 2020-07-31 PROCEDURE — 88302 TISSUE EXAM BY PATHOLOGIST: CPT | Performed by: UROLOGY

## 2020-07-31 PROCEDURE — 88302 TISSUE EXAM BY PATHOLOGIST: CPT | Mod: 26 | Performed by: UROLOGY

## 2020-07-31 RX ORDER — LIDOCAINE HYDROCHLORIDE 20 MG/ML
20 INJECTION, SOLUTION INFILTRATION; PERINEURAL ONCE
Status: COMPLETED | OUTPATIENT
Start: 2020-07-31 | End: 2020-07-31

## 2020-07-31 RX ADMIN — LIDOCAINE HYDROCHLORIDE 20 MG: 20 INJECTION, SOLUTION INFILTRATION; PERINEURAL at 09:03

## 2020-07-31 ASSESSMENT — MIFFLIN-ST. JEOR: SCORE: 1621.14

## 2020-07-31 ASSESSMENT — PAIN SCALES - GENERAL: PAINLEVEL: NO PAIN (0)

## 2020-07-31 NOTE — LETTER
7/31/2020       RE: Ella Elizabeth  [address] [facility] [id number]     Dear Colleague,    Thank you for referring your patient, Ella Elizabeth, to the [facility] UROLOG[facility] CLINIC [place] [facility]. Please see a copy of my visit note below.    OFFICE VASECTOMY OPERATIVE NOTE  [facility]  [phone]    DATE: 07/31/20  PATIENT: Ella Elizabeth    YOB: 1980    Ella Elizabeth is a 40 year old male.  He has 2 children and he wishes a vasectomy for birth control.  He has read the brochure and he has shaved himself.  I reviewed the vasectomy procedure with him explaining that it would be done with a local anesthetic given just in the location where the vasectomy would be done.  It would be done through scalpel-less incisions with the removal of segments of the vasa, cauterization of the ends, and burying the ends separate with sutures.      Pt. Understands:  1/1000-1/3000 risk of future pregnancy even with perfectly done vasectomy  -vasectomy is a permanent procedure    -he may cryopreserve sperm if he wishes   -1-5% risk of post-vasectomy pain syndrome   -1-5% risk of complication, primarily infection or bleeding  - he needs to have a semen sample that shows no sperm before getting approval for unprotected intercourse.      Complications such as bleeding, infection, and damage to other tissues in the area were discussed.  I recommended that an ice bag be placed on the scrotum off and on tonight to help reduce pain and swelling.      He was reminded that he was not sterile immediately after the vasectomy that it would take at least 20 ejaculations to empty the vas of any remaining sperm.  He was not to provide a semen sample until after the 20th ejaculation and not before 12 weeks after the vas. He was  to fulfill both of those requirements.   He understands it is his responsibility to find out the results of  the vas before proceeding with intercourse without birth control protection.  Other items discussed were activity afterwards, returning to work, voluntary physical activity,  resuming sexual activity, clothing to wear, bathing, and care of the vas site and expected changes in the site as healing progresses.  After signing the permit, bilateral vasectomy was done as described through scalpel-less incisions.       ANESTHESIA: Local    DETAILS OF PROCEDURE: The risks of the procedure were explained in detail to the patient and informed consent was obtained. The patient was placed supine on the procedure table and the penis and scrotum were prepped and draped in the standard sterile fashion. The right vas deferens was isolated and brought up to the median raphe of the scrotum. 1% lidocaine local anesthesia was used to infiltrate the skin and the spermatic cord. A sharp hemostat was used to make a skin puncture. Adventitial tissues were swept away from the vas. A 1 cm segment of the vas was excised and sent for pathology. The proximal and distal lumina of the vas were cauterized and then each segment was tied off in a knuckling-fashion with a 3-0 chromic suture. Hemostasis was ensured and the segments were released back into the scrotum. Next the left vas was brought up to the same incision and a vasectomy was performed in the similar fashion. At the end of the procedure a single 3-0 chromic suture was placed in the skin.     COMPLICATIONS: None    DISMISSAL INSTRUCTIONS:  - Ice pack to scrotum 15 to 20 minutes each hour awake for 36 to 40 hours.  - No strenuous activity or ejaculation for 14 days.  - No unprotected sexual activity until proven azoospermia on semen samples at 3 months.  - Referred to patient handout for normal postop expectations and indications to contact nurse or physician.    M.D.: Pratik Rubi M.D.

## 2020-07-31 NOTE — PATIENT INSTRUCTIONS
POST VASECTOMY INSTRUCTIONS    1.) If you have any concerns or questions, please contact our office at [phone].     2.) It is okay to take a shower, however, do not soak in water (bath,swimming, hot tub,etc....) until your incision is healed.    3.) You might notice some swelling, mild bruising, and discomfort for several days after your vasectomy. This is to be expected. For at least the next 24 hours, an ice pack should be applied for 20 minutes every hour that you are awake. Ice will help with discomfort and swelling. Do not place directly on the skin.    4.) No intercourse, strenuous activity or exercise for at least 7-10 days, even if you feel fine.    5.) You need to wear good scrotal support while you are healing. We strongly recommend an athletic supporter or a pair of regular briefs that are one size too small. Boxer briefs do not offer enough support.    6.) Tylenol as directed on the bottle is preferred for discomfort. Please avoid any blood thinning products such as ibuprofen and aspirin (Motrin, Advil, Excedrin, Aleve, ect..) for at least the next week.    7.) It is normal to have mild drainage from the incision area for several days. However, please contact our office if you notice: bright red blood that does not stop after three days, increased pain, heat at the incision, red streaks, foul smelling discharge, or if you start to run a fever.     8.) YOU MUST CONTINUE BIRTH CONTROL UNTIL WE CONFIRM YOUR STERILITY.  This process can take up to a year to complete (rare occurrence).     9.) You have been given a form with specimen cup and instructions for your follow up specimen. You will be cleared once we receive ONE negative specimen. If your specimen comes back positive (sperm seen) you will be asked to repeat the test. This does not mean that your vasectomy has failed.

## 2020-07-31 NOTE — NURSING NOTE
Chief Complaint   Patient presents with     Sterilization     Vasectomy     Patient has signed the consent form stating that we will be doing a bilateral vasectomy today and that this is the correct procedure.  I verbally confirmed the patient's identity using two indicators, relevant allergies, and that the correct equipment was available. Patient was cleaned and prepped according to the appropriate policy.  Equipment was prepped in a sterile fashion and MD was informed that patient was ready.    Consent read and signed: Yes  Aspirin/blood thinning products stopped 7 days prior to procedure: Yes  No Known Allergies    Physician performed procedure.  After the procedure the patient was instructed to wait approximately three months and at least 30 ejaculations prior to returning a sample to confirm sterility.  The patient was instructed to bring in sample within 3-6 hours post sample ejaculation.  Any additional medications after the procedure were sent to the patient's pharmacy and instructions were given according to company protocol and the performing physician.  The physician performing the procedure prescribed as they felt appropriate.  Patient was also instructed to avoid heavy lifting or strenuous activity for 10-14 days and to ice the scrotum.  Samples from the R and L vas deferens were sent to the lab and an order was placed for future semen analysis.      The following medication was given:     MEDICATION:  Lidocaine 2% Soln  ROUTE: Infiltration  SITE: Scrotum  DOSE: 14 mL  LOT #: [id number]  : [employer]  EXPIRATION DATE: 04/23  NDC#: 45435-894-44  Was there drug waste? Yes  Amount of drug waste (mL): 6.  Reason for waste:  As per MD  Multi-dose vial: Yes    GARCIA Henry  July 31, 2020    GARCIA Henry

## 2020-07-31 NOTE — PROGRESS NOTES
OFFICE VASECTOMY OPERATIVE NOTE  [facility]  ([phone]    DATE: 07/31/20  PATIENT: Ella Elizabeth    YOB: 1980    Ella Elizabeth is a 40 year old male.  He has 2 children and he wishes a vasectomy for birth control.  He has read the brochure and he has shaved himself.  I reviewed the vasectomy procedure with him explaining that it would be done with a local anesthetic given just in the location where the vasectomy would be done.  It would be done through scalpel-less incisions with the removal of segments of the vasa, cauterization of the ends, and burying the ends separate with sutures.      Pt. Understands:  1/1000-1/3000 risk of future pregnancy even with perfectly done vasectomy  -vasectomy is a permanent procedure    -he may cryopreserve sperm if he wishes   -1-5% risk of post-vasectomy pain syndrome   -1-5% risk of complication, primarily infection or bleeding  - he needs to have a semen sample that shows no sperm before getting approval for unprotected intercourse.      Complications such as bleeding, infection, and damage to other tissues in the area were discussed.  I recommended that an ice bag be placed on the scrotum off and on tonight to help reduce pain and swelling.      He was reminded that he was not sterile immediately after the vasectomy that it would take at least 20 ejaculations to empty the vas of any remaining sperm.  He was not to provide a semen sample until after the 20th ejaculation and not before 12 weeks after the vas. He was  to fulfill both of those requirements.   He understands it is his responsibility to find out the results of the vas before proceeding with intercourse without birth control protection.  Other items discussed were activity afterwards, returning to work, voluntary physical activity,  resuming sexual activity, clothing to wear, bathing, and care of the vas site and expected changes in the site as healing progresses.  After signing  the permit, bilateral vasectomy was done as described through scalpel-less incisions.       ANESTHESIA: Local    DETAILS OF PROCEDURE: The risks of the procedure were explained in detail to the patient and informed consent was obtained. The patient was placed supine on the procedure table and the penis and scrotum were prepped and draped in the standard sterile fashion. The right vas deferens was isolated and brought up to the median raphe of the scrotum. 1% lidocaine local anesthesia was used to infiltrate the skin and the spermatic cord. A sharp hemostat was used to make a skin puncture. Adventitial tissues were swept away from the vas. A 1 cm segment of the vas was excised and sent for pathology. The proximal and distal lumina of the vas were cauterized and then each segment was tied off in a knuckling-fashion with a 3-0 chromic suture. Hemostasis was ensured and the segments were released back into the scrotum. Next the left vas was brought up to the same incision and a vasectomy was performed in the similar fashion. At the end of the procedure a single 3-0 chromic suture was placed in the skin.     COMPLICATIONS: None    DISMISSAL INSTRUCTIONS:  - Ice pack to scrotum 15 to 20 minutes each hour awake for 36 to 40 hours.  - No strenuous activity or ejaculation for 14 days.  - No unprotected sexual activity until proven azoospermia on semen samples at 3 months.  - Referred to patient handout for normal postop expectations and indications to contact nurse or physician.    M.D.: Pratik Rubi M.D.

## 2020-08-04 LAB — COPATH REPORT: NORMAL

## 2021-11-08 ENCOUNTER — OFFICE VISIT (OUTPATIENT)
Dept: FAMILY MEDICINE | Facility: CLINIC | Age: 41
End: 2021-11-08

## 2021-11-08 VITALS
WEIGHT: 170 LBS | HEART RATE: 84 BPM | SYSTOLIC BLOOD PRESSURE: 116 MMHG | OXYGEN SATURATION: 98 % | HEIGHT: 67 IN | DIASTOLIC BLOOD PRESSURE: 74 MMHG | BODY MASS INDEX: 26.68 KG/M2 | TEMPERATURE: 98.1 F

## 2021-11-08 DIAGNOSIS — J02.9 SORE THROAT: Primary | ICD-10-CM

## 2021-11-08 LAB
COVID-19: NEGATIVE
S PYO AG THROAT QL IA.RAPID: NEGATIVE

## 2021-11-08 PROCEDURE — 87880 STREP A ASSAY W/OPTIC: CPT | Performed by: FAMILY MEDICINE

## 2021-11-08 PROCEDURE — 87635 SARS-COV-2 COVID-19 AMP PRB: CPT | Performed by: FAMILY MEDICINE

## 2021-11-08 PROCEDURE — 99213 OFFICE O/P EST LOW 20 MIN: CPT | Performed by: FAMILY MEDICINE

## 2021-11-08 PROCEDURE — 87070 CULTURE OTHR SPECIMN AEROBIC: CPT | Performed by: FAMILY MEDICINE

## 2021-11-08 ASSESSMENT — MIFFLIN-ST. JEOR: SCORE: 1629.74

## 2021-11-08 NOTE — LETTER
November 10, 2021      [address]  [address] [facility] [id number]        Dear ,    We are writing to inform you of your test results.    Your throat culture is also negative.    Resulted Orders   COVID-19 ([facility])   Result Value Ref Range    COVID-19 Negative    Rapid Strep Screen ([facility])   Result Value Ref Range    Rapid Strep A Screen Negative Negative   Throat Culture ( [facility])   Result Value Ref Range    Strep Group A Culture Throat NEG        If you have any questions or concerns, please call the clinic at the number listed above.       Sincerely,      Jesi Herrera MD

## 2021-11-08 NOTE — PROGRESS NOTES
Assessment & Plan   Problem List Items Addressed This Visit     None      Visit Diagnoses     Sore throat    -  Primary    Relevant Orders    COVID-19 ([facility]) (Completed)    Rapid Strep Screen ([facility]) (Completed)    Throat Culture ( [facility])         1. Sore throat  Negative strep and covid results. If still has symptoms in 2-3 days, retest for covid. Otherwise this is likely viral,continue to treat symptoms and followup as needed.  - COVID-19 ([facility])  - Rapid Strep Screen ([facility])  - Throat Culture ( [facility])    Results for orders placed or performed in visit on 11/08/21   COVID-19 ([facility])     Status: None   Result Value Ref Range    COVID-19 Negative    Rapid Strep Screen ([facility])     Status: None   Result Value Ref Range    Rapid Strep A Screen Negative Negative            FUTURE APPOINTMENTS:       - Follow-up visit as needed    No follow-ups on file.    Jesi Herrera MD  [facility]    Subjective     Nursing Notes:   Kaylene Cross, [facility]  11/8/2021 12:55 PM  Signed  Chief Complaint   Patient presents with     Covid Concern     sore throat started saturday night, painful to swallow, night sweats, body aches, slight cough and congestion      Pre-visit Screening:  Immunizations:  up to date  Colonoscopy:  NA  Mammogram: NA  Asthma Action Test/Plan:  NA  PHQ9:  NA  GAD7:  NA  Questioned patient about current smoking habits Pt. has never smoked.  Ok to leave detailed message on voice mail for today's visit only Yes, phone # [phone]           Ella Elizabeth is a 41 year old male who presents to clinic today for the following health issues  HPI     Has been vaccinated for covid.  Works from home symptoms started Saturday with sore throat, sweats, cough mostly due to throat irritation. No fevers, breathing is ok. Doesn't go out much, went to [place] on Friday night. No known exposure to strep or covid.      Review of Systems   Constitutional, HEENT, cardiovascular, pulmonary, gi and gu systems are negative, except  "as otherwise noted.      Objective    /74 (BP Location: Left arm, Patient Position: Sitting, Cuff Size: Adult Large)   Pulse 84   Temp 98.1  F (36.7  C) (Temporal)   Ht 1.702 m (5' 7\")   Wt 77.1 kg (170 lb)   SpO2 98%   BMI 26.63 kg/m    Body mass index is 26.63 kg/m .  Physical Exam   GENERAL: healthy, alert and no distress  EYES: Eyes grossly normal to inspection, PERRL and conjunctivae and sclerae normal  HENT: ear canals and TM's normal, nose and mouth without ulcers or lesions  NECK: no adenopathy, no asymmetry, masses, or scars and thyroid normal to palpation  RESP: lungs clear to auscultation - no rales, rhonchi or wheezes  CV: regular rate and rhythm, normal S1 S2, no S3 or S4, no murmur, click or rub, no peripheral edema and peripheral pulses strong  ABDOMEN: soft, nontender, no hepatosplenomegaly, no masses and bowel sounds normal  MS: no gross musculoskeletal defects noted, no edema  NEURO: Normal strength and tone, mentation intact and speech normal  PSYCH: mentation appears normal, affect normal/bright    Results for orders placed or performed in visit on 11/08/21   COVID-19 ([facility])     Status: None   Result Value Ref Range    COVID-19 Negative    Rapid Strep Screen ([facility])     Status: None   Result Value Ref Range    Rapid Strep A Screen Negative Negative         "

## 2021-11-08 NOTE — PATIENT INSTRUCTIONS
Assessment & Plan   Problem List Items Addressed This Visit     None      Visit Diagnoses     Sore throat    -  Primary    Relevant Orders    COVID-19 ([facility]) (Completed)    Rapid Strep Screen ([facility]) (Completed)    Throat Culture ( [facility])         1. Sore throat  Negative strep and covid results. If still has symptoms in 2-3 days, retest for covid. Otherwise this is likely viral,continue to treat symptoms and followup as needed.  - COVID-19 ([facility])  - Rapid Strep Screen ([facility])  - Throat Culture ( [facility])    Results for orders placed or performed in visit on 11/08/21   COVID-19 ([facility])     Status: None   Result Value Ref Range    COVID-19 Negative    Rapid Strep Screen ([facility])     Status: None   Result Value Ref Range    Rapid Strep A Screen Negative Negative            FUTURE APPOINTMENTS:       - Follow-up visit as needed    No follow-ups on file.    Jesi Herrera MD  [facility]

## 2021-11-08 NOTE — NURSING NOTE
Chief Complaint   Patient presents with     Covid Concern     sore throat started saturday night, painful to swallow, night sweats, body aches, slight cough and congestion      Pre-visit Screening:  Immunizations:  up to date  Colonoscopy:  NA  Mammogram: NA  Asthma Action Test/Plan:  NA  PHQ9:  NA  GAD7:  NA  Questioned patient about current smoking habits Pt. has never smoked.  Ok to leave detailed message on voice mail for today's visit only Yes, phone # [phone]

## 2021-11-10 LAB — STREP GROUP A CULTURE, THROAT - QUEST: NORMAL

## 2023-01-26 ENCOUNTER — THERAPY VISIT (OUTPATIENT)
Dept: PHYSICAL THERAPY | Facility: CLINIC | Age: 43
End: 2023-01-26
Payer: COMMERCIAL

## 2023-01-26 DIAGNOSIS — M25.511 RIGHT SHOULDER PAIN: ICD-10-CM

## 2023-01-26 DIAGNOSIS — M75.00 FROZEN SHOULDER: Primary | ICD-10-CM

## 2023-01-26 PROCEDURE — 97110 THERAPEUTIC EXERCISES: CPT | Mod: GP | Performed by: PHYSICAL THERAPIST

## 2023-01-26 PROCEDURE — 97161 PT EVAL LOW COMPLEX 20 MIN: CPT | Mod: GP | Performed by: PHYSICAL THERAPIST

## 2023-01-26 NOTE — PROGRESS NOTES
Physical Therapy Initial Evaluation  Subjective:    Patient Health History  Ella Elizabeth being seen for Right shoulder.     Problem began: 11/16/2022.   Problem occurred: Hockey   Pain is reported as 2/10 on pain scale.  General health as reported by patient is excellent.  Pertinent medical history includes: none.     Medical allergies: none.   Surgeries include:  Orthopedic surgery.    Current medications:  None.    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                Physical Therapy Initial Evaluation   1/26/2023   Precautions/Restrictions/MD instructions: PT eval and treat   Therapist Impression:   Pt is a 41 y/o male, with 2 month history of right shoulder pain. Pt presents with pain, decreased ROM, poor posture and decreased strength consistent with frozen shoulder. These impairments limit their ability to reach overhead, behind back, and sleep on right side. Skilled PT services necessary in order to reduce impairments and improve independent function.    Subjective:   Chief Complaint: R shoulder pain, playing hockey fell on ice and caught himself on out stretched arm  DOI/onset: 12/2/2022 DOS: none   Location: R shoulder  Quality: achy   Frequency: constant  Radiates: down lateral arm   Pain scale: Rest 1/10 Activity 9/10    Sleeping: pain with right side lying    Exacerbated by: reach behind back, over head, and lying on right side  Relieved by: NSAID's  Progression: worse  Previous Treatment: none Effect of prior treatment: n/a    PMH and/or surgical history: Left wrist  Imaging: Xrays    Occupation:  Job duties:  Computer    Current HEP/exercise regimen: hockey (Wednesday) and treadmill  Patient's goals: Full function and symmetry; able to sleep on right   Medications: none   General health as reported by patient: good   Return to MD: as needed   Red Flags: none                        Objective:  SHOULDER:    Standing Posture: rounded shoulders,  forward head      Shoulder: (* indicates patient's pain)   PROM L PROM R AROM L AROM R MMT L MMT R   Flex 160 130* 160 130* 5/5 4+/5   Abd         IR 70 40*   5/5 3+/5*   ER 80 20* 70 40* 5/5 4/5   Ext/IR   T6 L5*         Palpation: R sided: slight tender to UT and supra tendon      System    Physical Exam    General     ROS    Assessment/Plan:    Patient is a 42 year old male with right side shoulder complaints.    Patient has the following significant findings with corresponding treatment plan.                Diagnosis 1:  R shoulder pain; frozen shoulder  Pain -  hot/cold therapy, manual therapy, splint/taping/bracing/orthotics, self management, education and home program  Decreased ROM/flexibility - manual therapy and therapeutic exercise  Decreased joint mobility - manual therapy and therapeutic exercise  Decreased strength - therapeutic exercise and therapeutic activities  Inflammation - cold therapy and self management/home program  Impaired muscle performance - neuro re-education  Decreased function - therapeutic activities  Impaired posture - neuro re-education  Instability -  Therapeutic Activity  Therapeutic Exercise    Therapy Evaluation Codes:     Cumulative Therapy Evaluation is: Low complexity.    Previous and current functional limitations:  (See Goal Flow Sheet for this information)    Short term and Long term goals: (See Goal Flow Sheet for this information)     Communication ability:  Patient appears to be able to clearly communicate and understand verbal and written communication and follow directions correctly.  Treatment Explanation - The following has been discussed with the patient:   RX ordered/plan of care  Anticipated outcomes  Possible risks and side effects  This patient would benefit from PT intervention to resume normal activities.   Rehab potential is good.    Frequency:  1 X week, once daily  Duration:  for 6 weeks  Discharge Plan:  Achieve all LTG.  Independent in home treatment  program.  Reach maximal therapeutic benefit.    Please refer to the daily flowsheet for treatment today, total treatment time and time spent performing 1:1 timed codes.

## 2023-01-26 NOTE — LETTER
[facility]  [address]  [address]  [place] [address]  [phone]    2023    Re: Ella Elizabeth   :   1980  MRN:  [id number]   REFERRING PHYSICIAN:   Connor MCCOY [facility]      Date of Initial Evaluation: 22  Visits:  Rxs Used: 1  Reason for Referral:     Right shoulder pain  Frozen shoulder      EVALUATION SUMMARY    Physical Therapy Initial Evaluation  Subjective:    Patient Health History  Ella Elizabeth being seen for Right shoulder.     Problem began: 2022.   Problem occurred: Hockey   Pain is reported as 2/10 on pain scale.  General health as reported by patient is excellent.  Pertinent medical history includes: none.     Medical allergies: none.   Surgeries include:  Orthopedic surgery.    Current medications:  None.    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                         Re: Ella Elizabeth   :   1980                 Physical Therapy Initial Evaluation   2023   Precautions/Restrictions/MD instructions: PT eval and treat   Therapist Impression:   Pt is a 41 y/o male, with 2 month history of right shoulder pain. Pt presents with pain, decreased ROM, poor posture and decreased strength consistent with frozen shoulder. These impairments limit their ability to reach overhead, behind back, and sleep on right side. Skilled PT services necessary in order to reduce impairments and improve independent function.    Subjective:   Chief Complaint: R shoulder pain, playing hockey fell on ice and caught himself on out stretched arm  DOI/onset: 2022 DOS: none   Location: R shoulder  Quality: achy   Frequency: constant  Radiates: down lateral arm   Pain scale: Rest 1/10 Activity 9/10    Sleeping: pain with right side lying    Exacerbated by: reach behind back, over head, and lying  on right side  Relieved by: NSAID's  Progression: worse  Previous Treatment: none Effect of prior treatment: n/a    PMH and/or surgical history: Left wrist  Imaging: Xrays    Occupation:  Job duties:  Computer    Current HEP/exercise regimen: hockey (Wednesday) and treadmill  Patient's goals: Full function and symmetry; able to sleep on right   Medications: none   General health as reported by patient: good   Return to MD: as needed   Red Flags: none                        Re: Ella Elizabeth   :   1980    Objective:  SHOULDER:    Standing Posture: rounded shoulders, forward head      Shoulder: (* indicates patient's pain)   PROM L PROM R AROM L AROM R MMT L MMT R   Flex 160 130* 160 130* 5/5 4+/5   Abd         IR 70 40*   5/5 3+/5*   ER 80 20* 70 40* 5/5 4/5   Ext/IR   T6 L5*         Palpation: R sided: slight tender to UT and supra tendon      Assessment/Plan:    Patient is a 42 year old male with right side shoulder complaints.    Patient has the following significant findings with corresponding treatment plan.                Diagnosis 1:  R shoulder pain; frozen shoulder  Pain -  hot/cold therapy, manual therapy, splint/taping/bracing/orthotics, self management, education and home program  Decreased ROM/flexibility - manual therapy and therapeutic exercise  Decreased joint mobility - manual therapy and therapeutic exercise  Decreased strength - therapeutic exercise and therapeutic activities  Inflammation - cold therapy and self management/home program  Impaired muscle performance - neuro re-education  Decreased function - therapeutic activities  Impaired posture - neuro re-education  Instability -  Therapeutic Activity  Therapeutic Exercise    Therapy Evaluation Codes:     Cumulative Therapy Evaluation is: Low complexity.    Previous and current functional limitations:  (See Goal Flow Sheet for this information)    Short term and Long term goals: (See Goal Flow Sheet for this  information)     Communication ability:  Patient appears to be able to clearly communicate and understand verbal and written communication and follow directions correctly.  Treatment Explanation - The following has been discussed with the patient:   RX ordered/plan of care  Anticipated outcomes  Possible risks and side effects  This patient would benefit from PT intervention to resume normal activities.   Rehab potential is good.      Re: Ella Elizabeth   :   1980          Frequency:  1 X week, once daily  Duration:  for 6 weeks  Discharge Plan:  Achieve all LTG.  Independent in home treatment program.  Reach maximal therapeutic benefit.            Thank you for your referral.    INQUIRIES  Therapist: Lynnette Sanches DPT, [facility]  [id number] [place] [id number]  Phone: [phone]  Fax: [phone]

## 2023-02-02 ENCOUNTER — THERAPY VISIT (OUTPATIENT)
Dept: PHYSICAL THERAPY | Facility: CLINIC | Age: 43
End: 2023-02-02
Payer: COMMERCIAL

## 2023-02-02 DIAGNOSIS — M75.01 ADHESIVE CAPSULITIS OF RIGHT SHOULDER: ICD-10-CM

## 2023-02-02 DIAGNOSIS — M25.511 RIGHT SHOULDER PAIN: Primary | ICD-10-CM

## 2023-02-02 PROCEDURE — 97110 THERAPEUTIC EXERCISES: CPT | Mod: GP | Performed by: PHYSICAL THERAPIST

## 2023-02-02 PROCEDURE — 97140 MANUAL THERAPY 1/> REGIONS: CPT | Mod: GP | Performed by: PHYSICAL THERAPIST

## 2023-02-09 ENCOUNTER — THERAPY VISIT (OUTPATIENT)
Dept: PHYSICAL THERAPY | Facility: CLINIC | Age: 43
End: 2023-02-09
Payer: COMMERCIAL

## 2023-02-09 DIAGNOSIS — M75.01 ADHESIVE CAPSULITIS OF RIGHT SHOULDER: ICD-10-CM

## 2023-02-09 DIAGNOSIS — M25.511 RIGHT SHOULDER PAIN: Primary | ICD-10-CM

## 2023-02-09 PROCEDURE — 97140 MANUAL THERAPY 1/> REGIONS: CPT | Mod: GP | Performed by: PHYSICAL THERAPIST

## 2023-02-09 PROCEDURE — 97110 THERAPEUTIC EXERCISES: CPT | Mod: GP | Performed by: PHYSICAL THERAPIST

## 2023-02-16 ENCOUNTER — THERAPY VISIT (OUTPATIENT)
Dept: PHYSICAL THERAPY | Facility: CLINIC | Age: 43
End: 2023-02-16
Payer: COMMERCIAL

## 2023-02-16 DIAGNOSIS — M75.01 ADHESIVE CAPSULITIS OF RIGHT SHOULDER: ICD-10-CM

## 2023-02-16 DIAGNOSIS — M25.511 RIGHT SHOULDER PAIN: Primary | ICD-10-CM

## 2023-02-16 PROCEDURE — 97140 MANUAL THERAPY 1/> REGIONS: CPT | Mod: GP | Performed by: PHYSICAL THERAPIST

## 2023-02-16 PROCEDURE — 97110 THERAPEUTIC EXERCISES: CPT | Mod: GP | Performed by: PHYSICAL THERAPIST

## 2023-02-22 ENCOUNTER — OFFICE VISIT (OUTPATIENT)
Dept: ORTHOPEDICS | Facility: CLINIC | Age: 43
End: 2023-02-22
Payer: COMMERCIAL

## 2023-02-22 VITALS — WEIGHT: 170 LBS | HEIGHT: 67 IN | BODY MASS INDEX: 26.68 KG/M2

## 2023-02-22 DIAGNOSIS — M75.01 ADHESIVE CAPSULITIS OF RIGHT SHOULDER: ICD-10-CM

## 2023-02-22 DIAGNOSIS — M25.511 ACUTE PAIN OF RIGHT SHOULDER: Primary | ICD-10-CM

## 2023-02-22 PROCEDURE — 20610 DRAIN/INJ JOINT/BURSA W/O US: CPT | Mod: RT | Performed by: STUDENT IN AN ORGANIZED HEALTH CARE EDUCATION/TRAINING PROGRAM

## 2023-02-22 PROCEDURE — 99204 OFFICE O/P NEW MOD 45 MIN: CPT | Mod: 25 | Performed by: STUDENT IN AN ORGANIZED HEALTH CARE EDUCATION/TRAINING PROGRAM

## 2023-02-22 RX ORDER — LIDOCAINE HYDROCHLORIDE 10 MG/ML
4 INJECTION, SOLUTION INFILTRATION; PERINEURAL
Status: DISCONTINUED | OUTPATIENT
Start: 2023-02-22 | End: 2023-03-23

## 2023-02-22 RX ORDER — TRIAMCINOLONE ACETONIDE 40 MG/ML
40 INJECTION, SUSPENSION INTRA-ARTICULAR; INTRAMUSCULAR
Status: DISCONTINUED | OUTPATIENT
Start: 2023-02-22 | End: 2023-03-23

## 2023-02-22 RX ADMIN — LIDOCAINE HYDROCHLORIDE 4 ML: 10 INJECTION, SOLUTION INFILTRATION; PERINEURAL at 15:14

## 2023-02-22 RX ADMIN — TRIAMCINOLONE ACETONIDE 40 MG: 40 INJECTION, SUSPENSION INTRA-ARTICULAR; INTRAMUSCULAR at 15:14

## 2023-02-22 NOTE — PROGRESS NOTES
"ASSESSMENT & PLAN       1. Acute pain of right shoulder    2. Adhesive capsulitis of right shoulder      Ella Elizabeth is a 42 year old right-handed male presenting for evaluation of progressive right shoulder pain and limited motion after a hockey injury approx 4 months ago (Nov 2022). History, examination and normal XR imaging are consistent with adhesive capsulitis. Full strength on exam is reassuring against rotator cuff tear. We reviewed treatment plan inclusive of pain management (topicals, NSAIDS, steroid injection), activity modification (avoiding painful activities), formal physical therapy and timing of advance imaging (ie MRI).      At this time, plan to proceed with the following:  - A right subacromial shoulder cortisone injection was performed in clinic today without complication. Post-injection instructions provided below.  - Continue gentle rehab exercises but take it easy for the next 2 weeks while the cortisone takes effect, then resume formal physical therapy and gradually increase activity as tolerated based on pain.    Return to clinic in 4 weeks for recheck and a possible second injection (ultrasound-guided glenohumeral joint injection). This should be booked as a 40 minute \"possible injection\" visit. You may call our direct clinic number ([phone]) at any time with questions or concerns.    Latisha Mai MD, [facility]    -----    SUBJECTIVE  Ella Elizabeth is a/an 42 year old Right handed male who is seen as a self referral for evaluation of right shoulder pain. The patient is seen by themselves.    Onset: 4 month(s) ago (DOI 11/16/22). 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  Location of Pain: right lateral, posterior, anterior shoulder  Rating of Pain at worst: 8/10  Rating of Pain Currently: 1.5/10  Worsened by: overhead movement, reaching behind back, " "shoulder abduction, playing hockey  Better with: rest / activity avoidance  Treatments tried: rest/activity avoidance, ibuprofen at bedtime, home exercises and physical therapy (4 visits), xray @ [facility] 01/2023  Associated symptoms: intermittent tingling, decreased ROM since ~ January  Orthopedic history: YES - h/o right clavicle fx  Relevant surgical history: NO  Social history: Works - , plays and coaches hockey    Past Medical History:   Diagnosis Date     NO ACTIVE PROBLEMS      Social History     Socioeconomic History     Marital status:      Spouse name: Luna Coles     Number of children: 2     Years of education: [employer] grad   Occupational History     Occupation:    Tobacco Use     Smoking status: Never     Smokeless tobacco: Never   Substance and Sexual Activity     Alcohol use: Yes     Alcohol/week: 2.0 - 3.0 standard drinks     Types: 2 - 3 Standard drinks or equivalent per week     Drug use: No     Sexual activity: Yes     Partners: Female     Birth control/protection: Male Surgical   Other Topics Concern      Service No     Blood Transfusions No     Caffeine Concern No     Occupational Exposure No     Hobby Hazards No     Sleep Concern No     Stress Concern No     Weight Concern No     Special Diet No     Back Care No     Seat Belt Yes         Patient's past medical, surgical, social, and family histories were reviewed today and no changes are noted.    REVIEW OF SYSTEMS:  10 point ROS is negative other than symptoms noted above in HPI, Past Medical History or as stated below  Constitutional: NEGATIVE for fever, chills, change in weight  Skin: NEGATIVE for worrisome rashes, moles or lesions  GI/: NEGATIVE for bowel or bladder changes  Neuro: NEGATIVE for weakness, dizziness or paresthesias    OBJECTIVE:  Ht 1.702 m (5' 7\")   Wt 77.1 kg (170 lb)   BMI 26.63 kg/m     General: healthy, alert and in no distress  HEENT: no scleral icterus or conjunctival " erythema  Skin: no suspicious lesions or rash. No jaundice.  CV: regular rhythm by palpation  Resp: normal respiratory effort without conversational dyspnea   Psych: normal mood and affect  Gait: normal steady gait with appropriate coordination and balance  Neuro: normal light touch sensory exam of the bilateral upper extremities.    MSK:  RIGHT SHOULDER  Inspection:    Right shoulder elevation. No swelling, bruising, discoloration, or obvious deformity.  Palpation:    Mild tenderness about the anterior capsule and posterior capsule. Remainder of bony and tendinous landmarks are nontender.  Active=Passive Range of Motion:     Abduction 800, , , IR greater trochanter.    Strength:    Scapular plane abduction 5/5,  ER 5/5, IR 5/5, biceps 5/5, triceps 5/5  Special Tests:    Positive: Neer's, Galindo' and drop arm/painful arc    Negative: supraspinatus (empty can), lift-off and Speed's      Independent visualization of the below image:    X-ray right shoulder from [facility] in January 2023 was reviewed, negative for acute fracture, dislocation or degenerative changes.     Large Joint Injection/Arthocentesis: R subacromial bursa    Date/Time: 2/22/2023 3:14 PM  Performed by: Latisha Bobby MD  Authorized by: Latisha Bobby MD     Indications:  Pain  Needle Size:  25 G  Guidance: landmark guided    Approach:  Posterior  Location:  Shoulder      Site:  R subacromial bursa  Medications:  40 mg triamcinolone 40 MG/ML; 4 mL lidocaine 1 %  Outcome:  Tolerated well, no immediate complications  Procedure discussed: discussed risks, benefits, and alternatives    Consent Given by:  Patient  Timeout: timeout called immediately prior to procedure    Prep: patient was prepped and draped in usual sterile fashion          Latisha Mai MD, [facility]

## 2023-02-22 NOTE — PATIENT INSTRUCTIONS
"1. Acute pain of right shoulder    2. Adhesive capsulitis of right shoulder      Ella Elizabeth is a 42 year old right-handed male presenting for evaluation of progressive right shoulder pain and limited motion after a hockey injury approx 4 months ago (Nov 2022). History, examination and normal XR imaging are consistent with adhesive capsulitis. Full strength on exam is reassuring against rotator cuff tear. We reviewed treatment plan inclusive of pain management (topicals, NSAIDS, steroid injection), activity modification (avoiding painful activities), formal physical therapy and timing of advance imaging (ie MRI).      At this time, plan to proceed with the following:  - A right subacromial shoulder cortisone injection was performed in clinic today without complication. Post-injection instructions provided below.  - Continue gentle rehab exercises but take it easy for the next 2 weeks while the cortisone takes effect, then resume formal physical therapy and gradually increase activity as tolerated based on pain.    Return to clinic in 4 weeks for recheck and a second possible injection (ultrasound-guided glenohumeral joint injection). This should be booked as a 40 minute \"possible injection\" visit. You may call our direct clinic number ([phone]) at any time with questions or concerns.    Latisha Mai MD, [facility]    JOINT INJECTION AFTERCARE    After any kind of joint injection, it is not uncommon to experience:  - Soreness, swelling or bruising around the injection site.  - Mild numbness, tingling or weakness around the injection site caused by the numbing medicine used before or with the injection.     It is also possible to experience the following effects associated with the specific agent after injection:  - Allergic reaction.  - Increased blood sugar levels for 10-14 days following cortisone injection. If you have diabetes and notice that your " blood sugar levels have increased, notify your primary care physician.  - Increased blood pressure levels.  - Mood swings.  - Increased fluid accumulation in the injected joint.    These effects all should resolve within a day or two of your procedure.    Please note that it may take up to 14 days for the steroid (cortisone) medication to start working.     HOME CARE INSTRUCTIONS    - Limit yourself to light activity activity on the day of your procedure. Avoid lifting heavy objects, bending, stooping or twisting.   - You may shower, but please avoid swimming, hot tubs or tub baths for 24 hours following the procedure.   - Take over-the-counter pain medication (NSAIDS or Tylenol) for pain control after your procedure as needed.    - You may use ice packs for 10-15 minutes 3-4 times per day at the injection site to reduce pain and swelling after your procedure.   + Put ice in a plastic bag  + Place a towel between your skin and the ice bag  + Leave the ice on for no longer than 20 minutes each time to avoid injuring your skin or nerves  + This can be repeated 3-4 times per day for a few days after the injection    SEEK IMMEDIATE MEDICAL CARE IF:    - Pain and swelling get worse rather than better or extend beyond the injection site  - Numbness does not go away after a few hours  - Blood or fluid continues to leak from the injection site  - You experience chest pain  - You have swelling of your face or tongue  - You have trouble breathing or become dizzy  - You develop fever, chills or severe tenderness at the injection site that lasts longer than 1 day    MAKE SURE YOU:    - Understand these instructions  - Watch your condition  - Get help right away if you are not doing well or if you get worse

## 2023-02-22 NOTE — LETTER
"    2/22/2023         RE: Ella Elizabeth  [address] [address] [facility] [address] [facility] [address]        Dear Colleague,    Thank you for referring your patient, Ella Elizabeth, to the [facility] [place]. Please see a copy of my visit note below.    ASSESSMENT & PLAN       1. Acute pain of right shoulder    2. Adhesive capsulitis of right shoulder      Ella Elizabeth is a 42 year old right-handed male presenting for evaluation of progressive right shoulder pain and limited motion after a hockey injury approx 4 months ago (Nov 2022). History, examination and normal XR imaging are consistent with adhesive capsulitis. Full strength on exam is reassuring against rotator cuff tear. We reviewed treatment plan inclusive of pain management (topicals, NSAIDS, steroid injection), activity modification (avoiding painful activities), formal physical therapy and timing of advance imaging (ie MRI).      At this time, plan to proceed with the following:  - A right subacromial shoulder cortisone injection was performed in clinic today without complication. Post-injection instructions provided below.  - Continue gentle rehab exercises but take it easy for the next 2 weeks while the cortisone takes effect, then resume formal physical therapy and gradually increase activity as tolerated based on pain.    Return to clinic in 4 weeks for recheck and a possible second injection (ultrasound-guided glenohumeral joint injection). This should be booked as a 40 minute \"possible injection\" visit. You may call our direct clinic number ([phone]) at any time with questions or concerns.    Latisha Mai MD, [facility]    -----    SUBJECTIVE  Ella Elizabeth is a/an 42 year old Right handed male who is seen as a self referral for evaluation of right shoulder pain. The patient is seen by themselves.    Onset: 4 month(s) ago (DOI 11/16/22). 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  Location of Pain: right lateral, posterior, anterior shoulder  Rating of Pain at worst: 8/10  Rating of Pain Currently: 1.5/10  Worsened by: overhead movement, reaching behind back, shoulder abduction, playing hockey  Better with: rest / activity avoidance  Treatments tried: rest/activity avoidance, ibuprofen at bedtime, home exercises and physical therapy (4 visits), xray @ [facility] 01/2023  Associated symptoms: intermittent tingling, decreased ROM since ~ January  Orthopedic history: YES - h/o right clavicle fx  Relevant surgical history: NO  Social history: Works - , plays and coaches hockey    Past Medical History:   Diagnosis Date     NO ACTIVE PROBLEMS      Social History     Socioeconomic History     Marital status:      Spouse name: Luna Coles     Number of children: 2     Years of education: [employer] grad   Occupational History     Occupation:    Tobacco Use     Smoking status: Never     Smokeless tobacco: Never   Substance and Sexual Activity     Alcohol use: Yes     Alcohol/week: 2.0 - 3.0 standard drinks     Types: 2 - 3 Standard drinks or equivalent per week     Drug use: No     Sexual activity: Yes     Partners: Female     Birth control/protection: Male Surgical   Other Topics Concern      Service No     Blood Transfusions No     Caffeine Concern No     Occupational Exposure No     Hobby Hazards No     Sleep Concern No     Stress Concern No     Weight Concern No     Special Diet No     Back Care No     Seat Belt Yes         Patient's past medical, surgical, social, and family histories were reviewed today and no changes are noted.    REVIEW OF SYSTEMS:  10 point ROS is negative other than symptoms noted above in HPI, Past Medical History or as stated below  Constitutional: NEGATIVE for fever, chills, change in weight  Skin: NEGATIVE for worrisome rashes, moles or  "lesions  GI/: NEGATIVE for bowel or bladder changes  Neuro: NEGATIVE for weakness, dizziness or paresthesias    OBJECTIVE:  Ht 1.702 m (5' 7\")   Wt 77.1 kg (170 lb)   BMI 26.63 kg/m     General: healthy, alert and in no distress  HEENT: no scleral icterus or conjunctival erythema  Skin: no suspicious lesions or rash. No jaundice.  CV: regular rhythm by palpation  Resp: normal respiratory effort without conversational dyspnea   Psych: normal mood and affect  Gait: normal steady gait with appropriate coordination and balance  Neuro: normal light touch sensory exam of the bilateral upper extremities.    MSK:  RIGHT SHOULDER  Inspection:    Right shoulder elevation. No swelling, bruising, discoloration, or obvious deformity.  Palpation:    Mild tenderness about the anterior capsule and posterior capsule. Remainder of bony and tendinous landmarks are nontender.  Active=Passive Range of Motion:     Abduction 800, , , IR greater trochanter.    Strength:    Scapular plane abduction 5/5,  ER 5/5, IR 5/5, biceps 5/5, triceps 5/5  Special Tests:    Positive: Neer's, Galindo' and drop arm/painful arc    Negative: supraspinatus (empty can), lift-off and Speed's      Independent visualization of the below image:    X-ray right shoulder from [facility] in January 2023 was reviewed, negative for acute fracture, dislocation or degenerative changes.     Large Joint Injection/Arthocentesis: R subacromial bursa    Date/Time: 2/22/2023 3:14 PM  Performed by: Latisha Bobby MD  Authorized by: Latisha Bobby MD     Indications:  Pain  Needle Size:  25 G  Guidance: landmark guided    Approach:  Posterior  Location:  Shoulder      Site:  R subacromial bursa  Medications:  40 mg triamcinolone 40 MG/ML; 4 mL lidocaine 1 %  Outcome:  Tolerated well, no immediate complications  Procedure discussed: discussed risks, benefits, and alternatives    Consent Given by:  Patient  Timeout: timeout " called immediately prior to procedure    Prep: patient was prepped and draped in usual sterile fashion          Latisha Mai MD, [facility]        Again, thank you for allowing me to participate in the care of your patient.        Sincerely,        Latisha Mai MD

## 2023-03-16 ENCOUNTER — THERAPY VISIT (OUTPATIENT)
Dept: PHYSICAL THERAPY | Facility: CLINIC | Age: 43
End: 2023-03-16
Payer: COMMERCIAL

## 2023-03-16 DIAGNOSIS — M25.511 RIGHT SHOULDER PAIN: Primary | ICD-10-CM

## 2023-03-16 DIAGNOSIS — M75.01 ADHESIVE CAPSULITIS OF RIGHT SHOULDER: ICD-10-CM

## 2023-03-16 PROCEDURE — 97140 MANUAL THERAPY 1/> REGIONS: CPT | Mod: GP | Performed by: PHYSICAL THERAPIST

## 2023-03-16 PROCEDURE — 97110 THERAPEUTIC EXERCISES: CPT | Mod: GP | Performed by: PHYSICAL THERAPIST

## 2023-03-23 ENCOUNTER — OFFICE VISIT (OUTPATIENT)
Dept: ORTHOPEDICS | Facility: CLINIC | Age: 43
End: 2023-03-23
Payer: COMMERCIAL

## 2023-03-23 VITALS — WEIGHT: 170 LBS | HEIGHT: 67 IN | BODY MASS INDEX: 26.68 KG/M2

## 2023-03-23 DIAGNOSIS — M25.511 ACUTE PAIN OF RIGHT SHOULDER: Primary | ICD-10-CM

## 2023-03-23 DIAGNOSIS — M75.01 ADHESIVE CAPSULITIS OF RIGHT SHOULDER: ICD-10-CM

## 2023-03-23 PROCEDURE — 99213 OFFICE O/P EST LOW 20 MIN: CPT | Mod: 25 | Performed by: STUDENT IN AN ORGANIZED HEALTH CARE EDUCATION/TRAINING PROGRAM

## 2023-03-23 PROCEDURE — 20611 DRAIN/INJ JOINT/BURSA W/US: CPT | Mod: RT | Performed by: STUDENT IN AN ORGANIZED HEALTH CARE EDUCATION/TRAINING PROGRAM

## 2023-03-23 RX ORDER — TRIAMCINOLONE ACETONIDE 40 MG/ML
40 INJECTION, SUSPENSION INTRA-ARTICULAR; INTRAMUSCULAR
Status: DISCONTINUED | OUTPATIENT
Start: 2023-03-23 | End: 2024-07-25

## 2023-03-23 RX ORDER — LIDOCAINE HYDROCHLORIDE 10 MG/ML
4 INJECTION, SOLUTION INFILTRATION; PERINEURAL
Status: DISCONTINUED | OUTPATIENT
Start: 2023-03-23 | End: 2024-07-25

## 2023-03-23 RX ADMIN — TRIAMCINOLONE ACETONIDE 40 MG: 40 INJECTION, SUSPENSION INTRA-ARTICULAR; INTRAMUSCULAR at 08:13

## 2023-03-23 RX ADMIN — LIDOCAINE HYDROCHLORIDE 4 ML: 10 INJECTION, SOLUTION INFILTRATION; PERINEURAL at 08:13

## 2023-03-23 NOTE — LETTER
3/23/2023         RE: Ella Elizabeth  [address] [address] [facility] [address]        Dear Colleague,    Thank you for referring your patient, Ella Elizabeth, to the [facility] [place]. Please see a copy of my visit note below.    ASSESSMENT & PLAN    1. Acute pain of right shoulder    2. Adhesive capsulitis of right shoulder      Ella Elizabeth is a 43 year old right-handed male presenting for follow up of right shoulder adhesive capsulitis following a hockey injury in Nov 2022. He has not noticed improvement from a subacromial cortisone injection on 2/22/23, and is currently in physical therapy. Upon discussion of management options, patient elects to proceed with the following:    - An ultrasound-guided right glenohumeral joint cortisone injection was performed in clinic today without complication and with pain relief noted. Post-injection instructions provided below.  - Continue gentle rehab exercises but take it easy for the next 2 weeks while the cortisone takes effect, then resume formal physical therapy and gradually increase activity as tolerated based on pain.    Follow up in 3-4 months for recheck, or sooner as needed. You may call our direct clinic number ([phone]) at any time with questions or concerns.    Latisha Mai MD, [facility]    -----    SUBJECTIVE:  Ella Elizabeth is a 43 year old male who is seen in follow-up for right shoulder pain. They were last seen 2/22/2023 and had right subacromial cortisone injection.     Since their last visit reports no relief from subacromial cortisone injection. Patient reports pain is about the same or worse since last visit. Pain is worse at night and keeps him awake. They indicate that their current pain level is 2/10 and at night is 3-4/10. They have tried rest/activity avoidance, ibuprofen, home exercises, physical therapy (ongoing). He feels that the  "shoulder continues to get tighter and pain is most bothersome at night. He is interested in trying a glenohumeral cortisone injection as discussed at our last visit.    The patient is seen by themselves.    Patient's past medical, surgical, social, and family histories were reviewed today and no changes are noted.    REVIEW OF SYSTEMS:  Constitutional: NEGATIVE for fever, chills, change in weight  Skin: NEGATIVE for worrisome rashes, moles or lesions  GI/: NEGATIVE for bowel or bladder changes  Neuro: NEGATIVE for weakness, dizziness or paresthesias    OBJECTIVE:  Ht 1.702 m (5' 7\")   Wt 77.1 kg (170 lb)   BMI 26.63 kg/m     General: healthy, alert and in no distress  HEENT: no scleral icterus or conjunctival erythema  Skin: no suspicious lesions or rash. No jaundice.  CV: regular rhythm by palpation, no pedal edema  Resp: normal respiratory effort without conversational dyspnea   Psych: normal mood and affect  Gait: normal steady gait with appropriate coordination and balance  Neuro: normal light touch sensory exam of the extremities.      Large Joint Injection/Arthocentesis: R glenohumeral joint    Date/Time: 3/23/2023 8:13 AM  Performed by: Latisha Bobby MD  Authorized by: Latisha Bobby MD     Indications:  Pain and osteoarthritis  Needle Size:  22 G  Guidance: ultrasound    Approach:  Posterior  Location:  Shoulder      Site:  R glenohumeral joint  Medications:  40 mg triamcinolone 40 MG/ML; 4 mL lidocaine 1 %  Outcome:  Tolerated well, no immediate complications  Procedure discussed: discussed risks, benefits, and alternatives    Consent Given by:  Patient  Timeout: timeout called immediately prior to procedure    Prep: patient was prepped and draped in usual sterile fashion     An additional 4 mL of 1% lidocaine was used for anesthesia prior to joint injection.    Ultrasound was used to ensure safe and accurate needle placement and injection. Ultrasound images of the procedure were " permanently stored.    Patient rated his pain as 2/10 pre-injection and resolved (0/10) post-injection.      Latisha Mai MD, [facility]              Again, thank you for allowing me to participate in the care of your patient.        Sincerely,        Latisha Mai MD

## 2023-03-23 NOTE — PATIENT INSTRUCTIONS
1. Acute pain of right shoulder    2. Adhesive capsulitis of right shoulder      Ella Elizabeth is a 43 year old right-handed male presenting for follow up of frozen shoulder following a hockey injury in Nov 2022. He has not noticed improvement from a subacromial cortisone injection on 2/22/23, and is currently in physical therapy. Upon discussion of management options, patient elects to proceed with the following:    - An ultrasound-guided right glenohumeral joint cortisone injection was performed in clinic today without complication and with pain relief noted. Post-injection instructions provided below.  - Continue gentle rehab exercises but take it easy for the next 2 weeks while the cortisone takes effect, then resume formal physical therapy and gradually increase activity as tolerated based on pain.    Follow up in 3-4 months for recheck, or sooner as needed. You may call our direct clinic number ([phone]) at any time with questions or concerns.    Latisha Mai MD, [facility]    JOINT INJECTION AFTERCARE    After any kind of joint injection, it is not uncommon to experience:  - Soreness, swelling or bruising around the injection site.  - Mild numbness, tingling or weakness around the injection site caused by the numbing medicine used before or with the injection.     It is also possible to experience the following effects associated with the specific agent after injection:  - Allergic reaction.  - Increased blood sugar levels for 10-14 days following cortisone injection. If you have diabetes and notice that your blood sugar levels have increased, notify your primary care physician.  - Increased blood pressure levels.  - Mood swings.  - Increased fluid accumulation in the injected joint.    These effects all should resolve within a day or two of your procedure.    Please note that it may take up to 14 days for the steroid (cortisone) medication to start  working.     HOME CARE INSTRUCTIONS    - Limit yourself to light activity activity on the day of your procedure. Avoid lifting heavy objects, bending, stooping or twisting.   - You may shower, but please avoid swimming, hot tubs or tub baths for 24 hours following the procedure.   - Take over-the-counter pain medication (NSAIDS or Tylenol) for pain control after your procedure as needed.    - You may use ice packs for 10-15 minutes 3-4 times per day at the injection site to reduce pain and swelling after your procedure.   + Put ice in a plastic bag  + Place a towel between your skin and the ice bag  + Leave the ice on for no longer than 20 minutes each time to avoid injuring your skin or nerves  + This can be repeated 3-4 times per day for a few days after the injection    SEEK IMMEDIATE MEDICAL CARE IF:    - Pain and swelling get worse rather than better or extend beyond the injection site  - Numbness does not go away after a few hours  - Blood or fluid continues to leak from the injection site  - You experience chest pain  - You have swelling of your face or tongue  - You have trouble breathing or become dizzy  - You develop fever, chills or severe tenderness at the injection site that lasts longer than 1 day    MAKE SURE YOU:    - Understand these instructions  - Watch your condition  - Get help right away if you are not doing well or if you get worse

## 2023-03-23 NOTE — PROGRESS NOTES
ASSESSMENT & PLAN    1. Acute pain of right shoulder    2. Adhesive capsulitis of right shoulder      Ella Elizabeth is a 43 year old right-handed male presenting for follow up of right shoulder adhesive capsulitis following a hockey injury in Nov 2022. He has not noticed improvement from a subacromial cortisone injection on 2/22/23, and is currently in physical therapy. Upon discussion of management options, patient elects to proceed with the following:    - An ultrasound-guided right glenohumeral joint cortisone injection was performed in clinic today without complication and with pain relief noted. Post-injection instructions provided below.  - Continue gentle rehab exercises but take it easy for the next 2 weeks while the cortisone takes effect, then resume formal physical therapy and gradually increase activity as tolerated based on pain.    Follow up in 3-4 months for recheck, or sooner as needed. You may call our direct clinic number ([phone]) at any time with questions or concerns.    Latisha Mai MD, [facility]    -----    SUBJECTIVE:  Ella Elizabeth is a 43 year old male who is seen in follow-up for right shoulder pain. They were last seen 2/22/2023 and had right subacromial cortisone injection.     Since their last visit reports no relief from subacromial cortisone injection. Patient reports pain is about the same or worse since last visit. Pain is worse at night and keeps him awake. They indicate that their current pain level is 2/10 and at night is 3-4/10. They have tried rest/activity avoidance, ibuprofen, home exercises, physical therapy (ongoing). He feels that the shoulder continues to get tighter and pain is most bothersome at night. He is interested in trying a glenohumeral cortisone injection as discussed at our last visit.    The patient is seen by themselves.    Patient's past medical, surgical, social, and family histories were  "reviewed today and no changes are noted.    REVIEW OF SYSTEMS:  Constitutional: NEGATIVE for fever, chills, change in weight  Skin: NEGATIVE for worrisome rashes, moles or lesions  GI/: NEGATIVE for bowel or bladder changes  Neuro: NEGATIVE for weakness, dizziness or paresthesias    OBJECTIVE:  Ht 1.702 m (5' 7\")   Wt 77.1 kg (170 lb)   BMI 26.63 kg/m     General: healthy, alert and in no distress  HEENT: no scleral icterus or conjunctival erythema  Skin: no suspicious lesions or rash. No jaundice.  CV: regular rhythm by palpation, no pedal edema  Resp: normal respiratory effort without conversational dyspnea   Psych: normal mood and affect  Gait: normal steady gait with appropriate coordination and balance  Neuro: normal light touch sensory exam of the extremities.      Large Joint Injection/Arthocentesis: R glenohumeral joint    Date/Time: 3/23/2023 8:13 AM  Performed by: Latisha Bobby MD  Authorized by: Latisha Bobby MD     Indications:  Pain and osteoarthritis  Needle Size:  22 G  Guidance: ultrasound    Approach:  Posterior  Location:  Shoulder      Site:  R glenohumeral joint  Medications:  40 mg triamcinolone 40 MG/ML; 4 mL lidocaine 1 %  Outcome:  Tolerated well, no immediate complications  Procedure discussed: discussed risks, benefits, and alternatives    Consent Given by:  Patient  Timeout: timeout called immediately prior to procedure    Prep: patient was prepped and draped in usual sterile fashion     An additional 4 mL of 1% lidocaine was used for anesthesia prior to joint injection.    Ultrasound was used to ensure safe and accurate needle placement and injection. Ultrasound images of the procedure were permanently stored.    Patient rated his pain as 2/10 pre-injection and resolved (0/10) post-injection.      Latisha Mai MD, [facility]          "

## 2023-04-30 ENCOUNTER — HEALTH MAINTENANCE LETTER (OUTPATIENT)
Age: 43
End: 2023-04-30

## 2023-05-01 PROBLEM — M25.511 RIGHT SHOULDER PAIN: Status: RESOLVED | Noted: 2023-01-26 | Resolved: 2023-05-01

## 2024-05-04 ENCOUNTER — HEALTH MAINTENANCE LETTER (OUTPATIENT)
Age: 44
End: 2024-05-04

## 2024-07-25 ENCOUNTER — OFFICE VISIT (OUTPATIENT)
Dept: FAMILY MEDICINE | Facility: CLINIC | Age: 44
End: 2024-07-25

## 2024-07-25 VITALS
WEIGHT: 171 LBS | OXYGEN SATURATION: 98 % | SYSTOLIC BLOOD PRESSURE: 122 MMHG | HEIGHT: 67 IN | BODY MASS INDEX: 26.84 KG/M2 | TEMPERATURE: 98.6 F | RESPIRATION RATE: 20 BRPM | DIASTOLIC BLOOD PRESSURE: 82 MMHG | HEART RATE: 83 BPM

## 2024-07-25 DIAGNOSIS — Z11.59 ENCOUNTER FOR HEPATITIS C SCREENING TEST FOR LOW RISK PATIENT: ICD-10-CM

## 2024-07-25 DIAGNOSIS — N52.8 OTHER MALE ERECTILE DYSFUNCTION: ICD-10-CM

## 2024-07-25 DIAGNOSIS — Z13.29 SCREENING FOR ENDOCRINE, METABOLIC AND IMMUNITY DISORDER: ICD-10-CM

## 2024-07-25 DIAGNOSIS — Z00.00 ROUTINE GENERAL MEDICAL EXAMINATION AT A HEALTH CARE FACILITY: Primary | ICD-10-CM

## 2024-07-25 DIAGNOSIS — Z13.220 SCREENING FOR LIPOID DISORDERS: ICD-10-CM

## 2024-07-25 DIAGNOSIS — Z11.4 SCREENING FOR HIV (HUMAN IMMUNODEFICIENCY VIRUS): ICD-10-CM

## 2024-07-25 DIAGNOSIS — Z13.0 SCREENING FOR ENDOCRINE, METABOLIC AND IMMUNITY DISORDER: ICD-10-CM

## 2024-07-25 DIAGNOSIS — Z12.11 SPECIAL SCREENING FOR MALIGNANT NEOPLASMS, COLON: ICD-10-CM

## 2024-07-25 DIAGNOSIS — Z71.89 ACP (ADVANCE CARE PLANNING): ICD-10-CM

## 2024-07-25 DIAGNOSIS — Z13.228 SCREENING FOR ENDOCRINE, METABOLIC AND IMMUNITY DISORDER: ICD-10-CM

## 2024-07-25 LAB
BUN SERPL-MCNC: 16 MG/DL (ref 7–25)
BUN/CREATININE RATIO: 11 (ref 6–32)
CALCIUM SERPL-MCNC: 9.3 MG/DL (ref 8.6–10.3)
CHLORIDE SERPLBLD-SCNC: 101.5 MMOL/L (ref 98–110)
CHOLEST SERPL-MCNC: 138 MG/DL (ref 0–199)
CHOLEST/HDLC SERPL: 3 {RATIO} (ref 0–5)
CO2 SERPL-SCNC: 27.9 MMOL/L (ref 20–32)
CREAT SERPL-MCNC: 1.41 MG/DL (ref 0.6–1.3)
GFR SERPL CREATININE-BSD FRML MDRD: 63 ML/MIN/1.73M2
GLUCOSE SERPL-MCNC: 92 MG/DL (ref 60–99)
HDLC SERPL-MCNC: 44 MG/DL (ref 40–150)
LDLC SERPL CALC-MCNC: 72 MG/DL
POTASSIUM SERPL-SCNC: 4.43 MMOL/L (ref 3.5–5.3)
SODIUM SERPL-SCNC: 137.9 MMOL/L (ref 135–146)
TRIGL SERPL-MCNC: 109 MG/DL (ref 0–149)

## 2024-07-25 PROCEDURE — 99396 PREV VISIT EST AGE 40-64: CPT | Performed by: PHYSICIAN ASSISTANT

## 2024-07-25 PROCEDURE — 99213 OFFICE O/P EST LOW 20 MIN: CPT | Mod: 25 | Performed by: PHYSICIAN ASSISTANT

## 2024-07-25 PROCEDURE — 36415 COLL VENOUS BLD VENIPUNCTURE: CPT | Performed by: PHYSICIAN ASSISTANT

## 2024-07-25 PROCEDURE — 80048 BASIC METABOLIC PNL TOTAL CA: CPT | Performed by: PHYSICIAN ASSISTANT

## 2024-07-25 PROCEDURE — 80061 LIPID PANEL: CPT | Performed by: PHYSICIAN ASSISTANT

## 2024-07-25 RX ORDER — SILDENAFIL 50 MG/1
50 TABLET, FILM COATED ORAL DAILY PRN
Qty: 60 TABLET | Refills: 1 | Status: SHIPPED | OUTPATIENT
Start: 2024-07-25

## 2024-07-25 RX ORDER — MINOCYCLINE HYDROCHLORIDE 50 MG/1
50 CAPSULE ORAL 2 TIMES DAILY
COMMUNITY
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
## 2024-07-25 NOTE — NURSING NOTE
Chief Complaint   Patient presents with    Physical     Annual, fasting, due for colonoscopy     Erectile Dysfunction     Has some concerns with ED, wants to review options today      Pre-visit Screening:  Immunizations:  up to date  Colonoscopy:  is due and ordered today  Mammogram: na  Asthma Action Test/Plan:  na  PHQ9:  phq2 done today   GAD7:  na  Questioned patient about current smoking habits Pt. has never smoked.  Ok to leave detailed message on voice mail for today's visit only yes, phone # [phone] (home) [phone] (work)

## 2024-07-26 LAB
HCV AB - QUEST: NORMAL
HIV 1/2 AGN ABY 4TH GEN WITH REFLEX: NORMAL

## 2024-07-30 ENCOUNTER — OFFICE VISIT (OUTPATIENT)
Dept: ORTHOPEDICS | Facility: CLINIC | Age: 44
End: 2024-07-30
Payer: COMMERCIAL

## 2024-07-30 ENCOUNTER — ANCILLARY PROCEDURE (OUTPATIENT)
Dept: GENERAL RADIOLOGY | Facility: CLINIC | Age: 44
End: 2024-07-30
Attending: STUDENT IN AN ORGANIZED HEALTH CARE EDUCATION/TRAINING PROGRAM
Payer: COMMERCIAL

## 2024-07-30 VITALS — DIASTOLIC BLOOD PRESSURE: 80 MMHG | SYSTOLIC BLOOD PRESSURE: 124 MMHG

## 2024-07-30 DIAGNOSIS — M25.549 PAIN IN JOINT, HAND: ICD-10-CM

## 2024-07-30 DIAGNOSIS — M72.1: Primary | ICD-10-CM

## 2024-07-30 PROCEDURE — 99203 OFFICE O/P NEW LOW 30 MIN: CPT | Performed by: STUDENT IN AN ORGANIZED HEALTH CARE EDUCATION/TRAINING PROGRAM

## 2024-07-30 PROCEDURE — 73130 X-RAY EXAM OF HAND: CPT | Mod: TC | Performed by: RADIOLOGY
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
## 2024-08-22 ENCOUNTER — TRANSFERRED RECORDS (OUTPATIENT)
Dept: FAMILY MEDICINE | Facility: CLINIC | Age: 44
End: 2024-08-22

## 2025-08-30 ENCOUNTER — HEALTH MAINTENANCE LETTER (OUTPATIENT)
Age: 45
End: 2025-08-30